# Patient Record
Sex: MALE | Race: WHITE | NOT HISPANIC OR LATINO | Employment: FULL TIME | URBAN - METROPOLITAN AREA
[De-identification: names, ages, dates, MRNs, and addresses within clinical notes are randomized per-mention and may not be internally consistent; named-entity substitution may affect disease eponyms.]

---

## 2019-07-21 ENCOUNTER — OFFICE VISIT (OUTPATIENT)
Dept: URGENT CARE | Facility: CLINIC | Age: 34
End: 2019-07-21
Payer: OTHER MISCELLANEOUS

## 2019-07-21 ENCOUNTER — HOSPITAL ENCOUNTER (INPATIENT)
Facility: HOSPITAL | Age: 34
LOS: 2 days | Discharge: HOME | DRG: 269 | End: 2019-07-23
Attending: SURGERY | Admitting: SURGERY
Payer: OTHER MISCELLANEOUS

## 2019-07-21 ENCOUNTER — ANESTHESIA EVENT (INPATIENT)
Dept: PERIOP | Facility: HOSPITAL | Age: 34
DRG: 269 | End: 2019-07-21
Payer: OTHER MISCELLANEOUS

## 2019-07-21 ENCOUNTER — ANESTHESIA (INPATIENT)
Dept: PERIOP | Facility: HOSPITAL | Age: 34
DRG: 269 | End: 2019-07-21
Payer: OTHER MISCELLANEOUS

## 2019-07-21 ENCOUNTER — APPOINTMENT (INPATIENT)
Dept: RADIOLOGY | Facility: HOSPITAL | Age: 34
DRG: 269 | End: 2019-07-21
Payer: OTHER MISCELLANEOUS

## 2019-07-21 ENCOUNTER — HOSPITAL ENCOUNTER (EMERGENCY)
Facility: HOSPITAL | Age: 34
End: 2019-07-21
Attending: EMERGENCY MEDICINE | Admitting: EMERGENCY MEDICINE
Payer: OTHER MISCELLANEOUS

## 2019-07-21 VITALS
DIASTOLIC BLOOD PRESSURE: 82 MMHG | OXYGEN SATURATION: 99 % | TEMPERATURE: 98 F | HEART RATE: 80 BPM | SYSTOLIC BLOOD PRESSURE: 140 MMHG | RESPIRATION RATE: 18 BRPM | WEIGHT: 210 LBS

## 2019-07-21 VITALS
OXYGEN SATURATION: 99 % | DIASTOLIC BLOOD PRESSURE: 82 MMHG | TEMPERATURE: 98.1 F | RESPIRATION RATE: 20 BRPM | HEART RATE: 90 BPM | WEIGHT: 210 LBS | SYSTOLIC BLOOD PRESSURE: 130 MMHG

## 2019-07-21 DIAGNOSIS — M65.9 TENOSYNOVITIS: Primary | ICD-10-CM

## 2019-07-21 DIAGNOSIS — M79.89: ICD-10-CM

## 2019-07-21 DIAGNOSIS — L03.011 CELLULITIS OF FINGER OF RIGHT HAND: Primary | ICD-10-CM

## 2019-07-21 LAB
ALBUMIN SERPL BCP-MCNC: 4.1 G/DL (ref 3.5–5)
ALP SERPL-CCNC: 50 U/L (ref 46–116)
ALT SERPL W P-5'-P-CCNC: 27 U/L (ref 12–78)
ANION GAP SERPL CALCULATED.3IONS-SCNC: 7 MMOL/L (ref 4–13)
AST SERPL W P-5'-P-CCNC: 13 U/L (ref 5–45)
BASOPHILS # BLD AUTO: 0.05 THOUSANDS/ΜL (ref 0–0.1)
BASOPHILS NFR BLD AUTO: 1 % (ref 0–1)
BILIRUB SERPL-MCNC: 0.7 MG/DL (ref 0.2–1)
BUN SERPL-MCNC: 18 MG/DL (ref 5–25)
CALCIUM SERPL-MCNC: 8.7 MG/DL (ref 8.3–10.1)
CHLORIDE SERPL-SCNC: 104 MMOL/L (ref 100–108)
CO2 SERPL-SCNC: 30 MMOL/L (ref 21–32)
CREAT SERPL-MCNC: 0.88 MG/DL (ref 0.6–1.3)
EOSINOPHIL # BLD AUTO: 0.11 THOUSAND/ΜL (ref 0–0.61)
EOSINOPHIL NFR BLD AUTO: 1 % (ref 0–6)
ERYTHROCYTE [DISTWIDTH] IN BLOOD BY AUTOMATED COUNT: 12.7 % (ref 11.6–15.1)
GFR SERPL CREATININE-BSD FRML MDRD: 111 ML/MIN/1.73SQ M
GLUCOSE SERPL-MCNC: 100 MG/DL (ref 65–140)
HCT VFR BLD AUTO: 44 % (ref 36.5–49.3)
HGB BLD-MCNC: 14.5 G/DL (ref 12–17)
IMM GRANULOCYTES # BLD AUTO: 0.02 THOUSAND/UL (ref 0–0.2)
IMM GRANULOCYTES NFR BLD AUTO: 0 % (ref 0–2)
LYMPHOCYTES # BLD AUTO: 1.5 THOUSANDS/ΜL (ref 0.6–4.47)
LYMPHOCYTES NFR BLD AUTO: 16 % (ref 14–44)
MCH RBC QN AUTO: 27.8 PG (ref 26.8–34.3)
MCHC RBC AUTO-ENTMCNC: 33 G/DL (ref 31.4–37.4)
MCV RBC AUTO: 85 FL (ref 82–98)
MONOCYTES # BLD AUTO: 0.65 THOUSAND/ΜL (ref 0.17–1.22)
MONOCYTES NFR BLD AUTO: 7 % (ref 4–12)
NEUTROPHILS # BLD AUTO: 7.02 THOUSANDS/ΜL (ref 1.85–7.62)
NEUTS SEG NFR BLD AUTO: 75 % (ref 43–75)
NRBC BLD AUTO-RTO: 0 /100 WBCS
PLATELET # BLD AUTO: 251 THOUSANDS/UL (ref 149–390)
PMV BLD AUTO: 10.2 FL (ref 8.9–12.7)
POTASSIUM SERPL-SCNC: 3.6 MMOL/L (ref 3.5–5.3)
PROT SERPL-MCNC: 7.6 G/DL (ref 6.4–8.2)
RBC # BLD AUTO: 5.21 MILLION/UL (ref 3.88–5.62)
SODIUM SERPL-SCNC: 141 MMOL/L (ref 136–145)
WBC # BLD AUTO: 9.35 THOUSAND/UL (ref 4.31–10.16)

## 2019-07-21 PROCEDURE — 87070 CULTURE OTHR SPECIMN AEROBIC: CPT | Performed by: PHYSICIAN ASSISTANT

## 2019-07-21 PROCEDURE — 73120 X-RAY EXAM OF HAND: CPT

## 2019-07-21 PROCEDURE — 80053 COMPREHEN METABOLIC PANEL: CPT | Performed by: EMERGENCY MEDICINE

## 2019-07-21 PROCEDURE — 87147 CULTURE TYPE IMMUNOLOGIC: CPT | Performed by: PHYSICIAN ASSISTANT

## 2019-07-21 PROCEDURE — 99204 OFFICE O/P NEW MOD 45 MIN: CPT | Performed by: PHYSICIAN ASSISTANT

## 2019-07-21 PROCEDURE — 26020 DRAIN HAND TENDON SHEATH: CPT | Performed by: SURGERY

## 2019-07-21 PROCEDURE — 87040 BLOOD CULTURE FOR BACTERIA: CPT | Performed by: EMERGENCY MEDICINE

## 2019-07-21 PROCEDURE — 87186 SC STD MICRODIL/AGAR DIL: CPT | Performed by: PHYSICIAN ASSISTANT

## 2019-07-21 PROCEDURE — 96360 HYDRATION IV INFUSION INIT: CPT

## 2019-07-21 PROCEDURE — 87075 CULTR BACTERIA EXCEPT BLOOD: CPT | Performed by: SURGERY

## 2019-07-21 PROCEDURE — 87147 CULTURE TYPE IMMUNOLOGIC: CPT | Performed by: SURGERY

## 2019-07-21 PROCEDURE — 11403 EXC TR-EXT B9+MARG 2.1-3CM: CPT | Performed by: PHYSICIAN ASSISTANT

## 2019-07-21 PROCEDURE — 26020 DRAIN HAND TENDON SHEATH: CPT | Performed by: PHYSICIAN ASSISTANT

## 2019-07-21 PROCEDURE — 99222 1ST HOSP IP/OBS MODERATE 55: CPT | Performed by: SURGERY

## 2019-07-21 PROCEDURE — 85025 COMPLETE CBC W/AUTO DIFF WBC: CPT | Performed by: EMERGENCY MEDICINE

## 2019-07-21 PROCEDURE — 11043 DBRDMT MUSC&/FSCA 1ST 20/<: CPT | Performed by: SURGERY

## 2019-07-21 PROCEDURE — 99284 EMERGENCY DEPT VISIT MOD MDM: CPT

## 2019-07-21 PROCEDURE — 87070 CULTURE OTHR SPECIMN AEROBIC: CPT | Performed by: SURGERY

## 2019-07-21 PROCEDURE — 87205 SMEAR GRAM STAIN: CPT | Performed by: SURGERY

## 2019-07-21 PROCEDURE — 0JBJ0ZZ EXCISION OF RIGHT HAND SUBCUTANEOUS TISSUE AND FASCIA, OPEN APPROACH: ICD-10-PCS | Performed by: SURGERY

## 2019-07-21 PROCEDURE — 36415 COLL VENOUS BLD VENIPUNCTURE: CPT | Performed by: EMERGENCY MEDICINE

## 2019-07-21 PROCEDURE — 87205 SMEAR GRAM STAIN: CPT | Performed by: PHYSICIAN ASSISTANT

## 2019-07-21 RX ORDER — ONDANSETRON 2 MG/ML
4 INJECTION INTRAMUSCULAR; INTRAVENOUS EVERY 6 HOURS PRN
Status: DISCONTINUED | OUTPATIENT
Start: 2019-07-21 | End: 2019-07-23 | Stop reason: HOSPADM

## 2019-07-21 RX ORDER — SENNOSIDES 8.6 MG
1 TABLET ORAL DAILY
Status: DISCONTINUED | OUTPATIENT
Start: 2019-07-21 | End: 2019-07-23 | Stop reason: HOSPADM

## 2019-07-21 RX ORDER — BUPIVACAINE HYDROCHLORIDE 5 MG/ML
INJECTION, SOLUTION EPIDURAL; INTRACAUDAL AS NEEDED
Status: DISCONTINUED | OUTPATIENT
Start: 2019-07-21 | End: 2019-07-21 | Stop reason: HOSPADM

## 2019-07-21 RX ORDER — CHLORHEXIDINE GLUCONATE 0.12 MG/ML
15 RINSE ORAL ONCE
Status: CANCELLED | OUTPATIENT
Start: 2019-07-21 | End: 2019-07-21

## 2019-07-21 RX ORDER — KETOROLAC TROMETHAMINE 30 MG/ML
60 INJECTION, SOLUTION INTRAMUSCULAR; INTRAVENOUS ONCE
Status: DISCONTINUED | OUTPATIENT
Start: 2019-07-21 | End: 2019-07-21 | Stop reason: HOSPADM

## 2019-07-21 RX ORDER — CEFAZOLIN SODIUM 1 G/50ML
SOLUTION INTRAVENOUS AS NEEDED
Status: DISCONTINUED | OUTPATIENT
Start: 2019-07-21 | End: 2019-07-21 | Stop reason: SURG

## 2019-07-21 RX ORDER — OXYCODONE HYDROCHLORIDE 5 MG/1
5 TABLET ORAL EVERY 4 HOURS PRN
Status: DISCONTINUED | OUTPATIENT
Start: 2019-07-21 | End: 2019-07-22

## 2019-07-21 RX ORDER — TRAMADOL HYDROCHLORIDE 50 MG/1
50 TABLET ORAL EVERY 6 HOURS PRN
Status: DISCONTINUED | OUTPATIENT
Start: 2019-07-21 | End: 2019-07-23 | Stop reason: HOSPADM

## 2019-07-21 RX ORDER — CEFAZOLIN SODIUM 1 G/50ML
1000 SOLUTION INTRAVENOUS ONCE
Status: COMPLETED | OUTPATIENT
Start: 2019-07-21 | End: 2019-07-21

## 2019-07-21 RX ORDER — FENTANYL CITRATE 50 UG/ML
INJECTION, SOLUTION INTRAMUSCULAR; INTRAVENOUS AS NEEDED
Status: DISCONTINUED | OUTPATIENT
Start: 2019-07-21 | End: 2019-07-21 | Stop reason: SURG

## 2019-07-21 RX ORDER — SODIUM CHLORIDE 9 MG/ML
INJECTION, SOLUTION INTRAVENOUS CONTINUOUS PRN
Status: DISCONTINUED | OUTPATIENT
Start: 2019-07-21 | End: 2019-07-21 | Stop reason: SURG

## 2019-07-21 RX ORDER — MAGNESIUM HYDROXIDE 1200 MG/15ML
LIQUID ORAL AS NEEDED
Status: DISCONTINUED | OUTPATIENT
Start: 2019-07-21 | End: 2019-07-21 | Stop reason: HOSPADM

## 2019-07-21 RX ORDER — PROPOFOL 10 MG/ML
INJECTION, EMULSION INTRAVENOUS CONTINUOUS PRN
Status: DISCONTINUED | OUTPATIENT
Start: 2019-07-21 | End: 2019-07-21 | Stop reason: SURG

## 2019-07-21 RX ORDER — ONDANSETRON 2 MG/ML
4 INJECTION INTRAMUSCULAR; INTRAVENOUS ONCE AS NEEDED
Status: DISCONTINUED | OUTPATIENT
Start: 2019-07-21 | End: 2019-07-21 | Stop reason: HOSPADM

## 2019-07-21 RX ORDER — FENTANYL CITRATE/PF 50 MCG/ML
25 SYRINGE (ML) INJECTION
Status: DISCONTINUED | OUTPATIENT
Start: 2019-07-21 | End: 2019-07-21 | Stop reason: HOSPADM

## 2019-07-21 RX ORDER — DOCUSATE SODIUM 100 MG/1
100 CAPSULE, LIQUID FILLED ORAL 2 TIMES DAILY
Status: DISCONTINUED | OUTPATIENT
Start: 2019-07-21 | End: 2019-07-23 | Stop reason: HOSPADM

## 2019-07-21 RX ORDER — LIDOCAINE HYDROCHLORIDE 10 MG/ML
INJECTION, SOLUTION EPIDURAL; INFILTRATION; INTRACAUDAL; PERINEURAL AS NEEDED
Status: DISCONTINUED | OUTPATIENT
Start: 2019-07-21 | End: 2019-07-21 | Stop reason: HOSPADM

## 2019-07-21 RX ORDER — CALCIUM CARBONATE 200(500)MG
1000 TABLET,CHEWABLE ORAL DAILY PRN
Status: DISCONTINUED | OUTPATIENT
Start: 2019-07-21 | End: 2019-07-23 | Stop reason: HOSPADM

## 2019-07-21 RX ORDER — ACETAMINOPHEN 325 MG/1
650 TABLET ORAL EVERY 6 HOURS PRN
Status: DISCONTINUED | OUTPATIENT
Start: 2019-07-21 | End: 2019-07-22

## 2019-07-21 RX ADMIN — SODIUM CHLORIDE: 0.9 INJECTION, SOLUTION INTRAVENOUS at 16:06

## 2019-07-21 RX ADMIN — KETOROLAC TROMETHAMINE 60 MG: 30 INJECTION, SOLUTION INTRAMUSCULAR; INTRAVENOUS at 08:55

## 2019-07-21 RX ADMIN — ACETAMINOPHEN 650 MG: 325 TABLET, FILM COATED ORAL at 22:03

## 2019-07-21 RX ADMIN — SODIUM CHLORIDE 1000 ML: 0.9 INJECTION, SOLUTION INTRAVENOUS at 10:25

## 2019-07-21 RX ADMIN — OXYCODONE HYDROCHLORIDE 5 MG: 5 TABLET ORAL at 22:25

## 2019-07-21 RX ADMIN — TRAMADOL HYDROCHLORIDE 50 MG: 50 TABLET, COATED ORAL at 20:29

## 2019-07-21 RX ADMIN — CEFAZOLIN SODIUM 1000 MG: 1 SOLUTION INTRAVENOUS at 15:35

## 2019-07-21 RX ADMIN — FENTANYL CITRATE 100 MCG: 50 INJECTION INTRAMUSCULAR; INTRAVENOUS at 16:00

## 2019-07-21 RX ADMIN — STANDARDIZED SENNA CONCENTRATE 8.6 MG: 8.6 TABLET ORAL at 14:56

## 2019-07-21 RX ADMIN — CEFAZOLIN SODIUM 1000 MG: 1 SOLUTION INTRAVENOUS at 16:13

## 2019-07-21 RX ADMIN — FENTANYL CITRATE 100 MCG: 50 INJECTION INTRAMUSCULAR; INTRAVENOUS at 16:07

## 2019-07-21 RX ADMIN — AMPICILLIN SODIUM AND SULBACTAM SODIUM 3 G: 2; 1 INJECTION, POWDER, FOR SOLUTION INTRAMUSCULAR; INTRAVENOUS at 21:53

## 2019-07-21 RX ADMIN — PROPOFOL 100 MCG/KG/MIN: 10 INJECTION, EMULSION INTRAVENOUS at 16:07

## 2019-07-21 NOTE — ANESTHESIA PREPROCEDURE EVALUATION
Review of Systems/Medical History  Patient summary reviewed        Cardiovascular  Negative cardio ROS    Pulmonary  Negative pulmonary ROS        GI/Hepatic  Negative GI/hepatic ROS          Negative  ROS        Endo/Other  Negative endo/other ROS      GYN  Negative gynecology ROS          Hematology  Negative hematology ROS      Musculoskeletal  Negative musculoskeletal ROS        Neurology  Negative neurology ROS      Psychology   Negative psychology ROS              Physical Exam    Airway    Mallampati score: II  TM Distance: >3 FB  Neck ROM: full     Dental       Cardiovascular  Comment: Negative ROS, Cardiovascular exam normal    Pulmonary  Pulmonary exam normal     Other Findings        Anesthesia Plan  ASA Score- 1 Emergent    Anesthesia Type- IV sedation with anesthesia with ASA Monitors  Additional Monitors:   Airway Plan:         Plan Factors-    Induction- intravenous  Postoperative Plan-     Informed Consent- Anesthetic plan and risks discussed with patient  I personally reviewed this patient with the CRNA  Discussed and agreed on the Anesthesia Plan with the CRNA  Edna Marion

## 2019-07-21 NOTE — PATIENT INSTRUCTIONS
Cellulitis of the right fourth digit:   -There was severe edema and erythema of the entire digit  There is a wound abscess of the right fourth digit over the PIP joint likely secondary to the beesting  There is also concern for septic arthritis as there is warmth and severe pain  The digit was punctured with an 11 blade and copious amounts of purulent drainage was expressed  The wound was cultured  The patient had immediate improvement of his pain but was still tearful and in distress  Toradol 60mg IM was given for the pain  We discussed that the patient may need to see a hand surgeon and may need IV antibiotic for the infection and it was advised he go to the ED  The wound was covered and he was sent to the ED by car as his wife wanted to drive him  Report was given to the ED

## 2019-07-21 NOTE — ED PROVIDER NOTES
History  Chief Complaint   Patient presents with    Abscess     Pt states that he was stung by a bee on the 4th digit of R hand on Tues, started having pain on Fri night, and swelling Sat  Pt states that he is unable to move digit  Patient is a 13-year-old male that presents from our outpatient clinic for a painful swollen right 4th finger  Patient states 4 days ago he fell to get stung on the finger there was some minimal discomfort but there was no swelling or redness  Patient states 2 days ago it started to swell up on him and yesterday it became very red and painful and he was unable to bend his finger  There has been no fevers or chills, no other symptoms  Patient was seen at her outpatient clinic given IM Toradol for pain relief and the medical professional at their also did a small stab incision to the palmar surface of his finger between the PIP and the IP joints with reportedly copious amount of pus-like material   Patient is not a diabetic  Prior to Admission Medications   Prescriptions: None   Facility-Administered Medications Last Administration Doses Remaining   ketorolac (TORADOL) injection 60 mg 7/21/2019  8:55 AM 0          History reviewed  No pertinent past medical history  History reviewed  No pertinent surgical history  History reviewed  No pertinent family history  I have reviewed and agree with the history as documented  Social History     Tobacco Use    Smoking status: Never Smoker    Smokeless tobacco: Never Used   Substance Use Topics    Alcohol use: Yes     Comment: rarely    Drug use: Never        Review of Systems   Constitutional: Negative for chills and fever  HENT: Negative for facial swelling and trouble swallowing  Respiratory: Negative for chest tightness and shortness of breath  Cardiovascular: Negative for chest pain and leg swelling  Gastrointestinal: Negative for abdominal pain, nausea and vomiting     Genitourinary: Negative for dysuria and flank pain  Musculoskeletal: Negative for back pain and neck pain  Skin: Positive for color change  Neurological: Negative for weakness and numbness  Hematological: Negative  Psychiatric/Behavioral: Negative  Physical Exam  Physical Exam   Constitutional: He is oriented to person, place, and time  He appears well-developed and well-nourished  HENT:   Head: Normocephalic and atraumatic  Neck: Normal range of motion  Neck supple  Cardiovascular: Normal rate and regular rhythm  Pulmonary/Chest: Effort normal and breath sounds normal    Abdominal: Soft  He exhibits no distension  There is no tenderness  Musculoskeletal:        Right hand: He exhibits decreased range of motion, tenderness, bony tenderness and swelling  He exhibits normal two-point discrimination  Normal sensation noted  Hands:  Patient's right hand has no palmar, vena, or hypo thenar tenderness  The patient is unable to flex his finger and is tender to palpation along the tendon sheaths   Neurological: He is alert and oriented to person, place, and time  Skin: Skin is warm  There is erythema  Psychiatric: He has a normal mood and affect  Nursing note and vitals reviewed                Vital Signs  ED Triage Vitals [07/21/19 0937]   Temperature Pulse Respirations Blood Pressure SpO2   98 °F (36 7 °C) 80 18 140/82 99 %      Temp src Heart Rate Source Patient Position - Orthostatic VS BP Location FiO2 (%)   -- -- -- -- --      Pain Score       8           Vitals:    07/21/19 0937   BP: 140/82   Pulse: 80         Visual Acuity      ED Medications  Medications   sodium chloride 0 9 % bolus 1,000 mL (1,000 mL Intravenous New Bag 7/21/19 1025)       Diagnostic Studies  Results Reviewed     Procedure Component Value Units Date/Time    Comprehensive metabolic panel [[id number]] Collected:  07/21/19 1025    Lab Status:  Final result Specimen:  Blood from Arm, Left Updated:  07/21/19 1048     Sodium 141 mmol/L Potassium 3 6 mmol/L      Chloride 104 mmol/L      CO2 30 mmol/L      ANION GAP 7 mmol/L      BUN 18 mg/dL      Creatinine 0 88 mg/dL      Glucose 100 mg/dL      Calcium 8 7 mg/dL      AST 13 U/L      ALT 27 U/L      Alkaline Phosphatase 50 U/L      Total Protein 7 6 g/dL      Albumin 4 1 g/dL      Total Bilirubin 0 70 mg/dL      eGFR 111 ml/min/1 73sq m     Narrative:       National Kidney Disease Foundation guidelines for Chronic Kidney Disease (CKD):     Stage 1 with normal or high GFR (GFR > 90 mL/min/1 73 square meters)    Stage 2 Mild CKD (GFR = 60-89 mL/min/1 73 square meters)    Stage 3A Moderate CKD (GFR = 45-59 mL/min/1 73 square meters)    Stage 3B Moderate CKD (GFR = 30-44 mL/min/1 73 square meters)    Stage 4 Severe CKD (GFR = 15-29 mL/min/1 73 square meters)    Stage 5 End Stage CKD (GFR <15 mL/min/1 73 square meters)  Note: GFR calculation is accurate only with a steady state creatinine    CBC and differential [[id number]] Collected:  07/21/19 1025    Lab Status:  Final result Specimen:  Blood from Arm, Left Updated:  07/21/19 1033     WBC 9 35 Thousand/uL      RBC 5 21 Million/uL      Hemoglobin 14 5 g/dL      Hematocrit 44 0 %      MCV 85 fL      MCH 27 8 pg      MCHC 33 0 g/dL      RDW 12 7 %      MPV 10 2 fL      Platelets 985 Thousands/uL      nRBC 0 /100 WBCs      Neutrophils Relative 75 %      Immat GRANS % 0 %      Lymphocytes Relative 16 %      Monocytes Relative 7 %      Eosinophils Relative 1 %      Basophils Relative 1 %      Neutrophils Absolute 7 02 Thousands/µL      Immature Grans Absolute 0 02 Thousand/uL      Lymphocytes Absolute 1 50 Thousands/µL      Monocytes Absolute 0 65 Thousand/µL      Eosinophils Absolute 0 11 Thousand/µL      Basophils Absolute 0 05 Thousands/µL     Blood culture #2 [[id number]] Collected:  07/21/19 1025    Lab Status:   In process Specimen:  Blood from Arm, Right Updated:  07/21/19 1031    Blood culture #1 [[id number]] Collected:  07/21/19 1020    Lab Status: In process Specimen:  Blood from Arm, Left Updated:  07/21/19 1031    Lactic acid, plasma [[id number]] Collected:  07/21/19 1025    Lab Status:  No result Specimen:  Blood from Arm, Left                  No orders to display              Procedures  Procedures       ED Course                               MDM  Number of Diagnoses or Management Options  Tenosynovitis:   Diagnosis management comments: Patient most likely has a significant cellulitis or possibly a tenosynovitis  I spoke to the hand surgeon on call Dr Lynette Santoro who is on call at our [facility] hospital and therefore the patient is going to be transferred over to [facility] for further treatment and evaluation  Answered all questions best my ability, patient is wife state understanding and are in agreement with the assessment plan  Amount and/or Complexity of Data Reviewed  Clinical lab tests: ordered and reviewed        Disposition  Final diagnoses:   Tenosynovitis     Time reflects when diagnosis was documented in both MDM as applicable and the Disposition within this note     Time User Action Codes Description Comment    7/21/2019 10:56 AM Reji Sharma Add [M65 9] Tenosynovitis       ED Disposition     ED Disposition Condition Date/Time Comment    Transfer to Another Facility-In Network  [place] Jul 21, 2019 10:57 AM Jordy Moore should be transferred out to [facility] and has been medically cleared          MD Documentation      Most Recent Value   Patient Condition  The patient has been stabilized such that within reasonable medical probability, no material deterioration of the patient condition or the condition of the unborn child(humberto) is likely to result from the transfer   Reason for Transfer  Level of Care needed not available at this facility   Benefits of Transfer  Specialized equipment and/or services available at the receiving facility (Include comment)________________________   Risks of Transfer  Potential for delay in receiving treatment   Accepting Physician  Dr Brittany Rojas Name, [facility]   Sending MD  [address]   Provider Certification  Risk of worsening condition      RN Documentation      Most [facility] Name, [address]      Follow-up Information    None         There are no discharge medications for this patient  No discharge procedures on file      ED Provider  Electronically Signed by           Jonathan Poole MD  07/21/19 6934

## 2019-07-21 NOTE — ED NOTES
Patient's fourth finger,right hand noted to be swollen,red,painful,difficult to move  ER MD to examine patient       Sivan Russell RN  07/21/19 8372

## 2019-07-21 NOTE — ED NOTES
Spoke with nurse in [address] at [facility] , Dr Tora Dakin unable to see pt for 2-3 hours , pt and wife upset told it was an emergency and that he would be started on antibiotics as soon as he arrived  (have received no orders from Dr Tora Dakin)  Wife on phone with [facility] regarding transfer , IV line pulled prior to leaving [place]  Because pt declined on ems transport        Marni Gowers, RN  07/21/19 9007

## 2019-07-21 NOTE — DISCHARGE INSTRUCTIONS
You have had surgery on your arm today, please read and follow the information below   Elevate your hand above your elbow during the next 24-48 hours to help with swelling   Place your hand and arm over your head with motion at your shoulder three times a day   Do not apply any cream/ointment/oil to your incisions including antibiotics   Do not soak your hands in standing water (dishwater, tubs, Jacuzzis, pools, etc) until given permission (typically 2-3 weeks after injury)  Call the office if you notice any:   Increased numbness or tingling of you hand or fingers that is not relieved with elevation   Increasing pain that is not controlled with medication   Difficulty chewing, breathing, swallowing   Chest pains or shortness of breath   Fever over 101 4 degrees    Additional Information  Do not remove your dressings until follow-up  Move your fingers 10 times per hour  Do not move any splinted fingers  , No sling is needed  For pain please see your prescription for [place] and Toradol  Complete script for Bactrim DS    Please arrange for a follow-up in 5-7 days

## 2019-07-21 NOTE — EMTALA/ACUTE CARE TRANSFER
[address] [address]  Dept: [phone]      EMTALA TRANSFER CONSENT    NAME Willi Prater                                         1985                              MRN [id number]    I have been informed of my rights regarding examination, treatment, and transfer   by Dr Cathlean Schlatter, MD    Benefits: Specialized equipment and/or services available at the receiving facility (Include comment)________________________    Risks: Potential for delay in receiving treatment      Transfer Request   I acknowledge that my medical condition has been evaluated and explained to me by the emergency department physician or other qualified medical person and/or my attending physician who has recommended and offered to me further medical examination and treatment  I understand the Hospital's obligation with respect to the treatment and stabilization of my emergency medical condition  I nevertheless request to be transferred  I release the Hospital, the doctor, and any other persons caring for me from all responsibility or liability for any injury or ill effects that may result from my transfer and agree to accept all responsibility for the consequences of my choice to transfer, rather than receive stabilizing treatment at the Hospital  I understand that because the transfer is my request, my insurance may not provide reimbursement for the services  The Hospital will assist and direct me and my family in how to make arrangements for transfer, but the hospital is not liable for any fees charged by the transport service  In spite of this understanding, I refuse to consent to further medical examination and treatment which has been offered to me, and request transfer to  [facility]ette John Name, [address] : [address]   I authorize the performance of emergency medical procedures and treatments upon me in both transit and upon arrival at the receiving facility  Additionally, I authorize the release of any and all medical records to the receiving facility and request they be transported with me, if possible  I authorize the performance of emergency medical procedures and treatments upon me in both transit and upon arrival at the receiving facility  Additionally, I authorize the release of any and all medical records to the receiving facility and request they be transported with me, if possible  I understand that the safest mode of transportation during a medical emergency is an ambulance and that the Hospital advocates the use of this mode of transport  Risks of traveling to the receiving facility by car, including absence of medical control, life sustaining equipment, such as oxygen, and medical personnel has been explained to me and I fully understand them  (AARTI CORRECT BOX BELOW)  [  ]  I consent to the stated transfer and to be transported by ambulance/helicopter  [  ]  I consent to the stated transfer, but refuse transportation by ambulance and accept full responsibility for my transportation by car  I understand the risks of non-ambulance transfers and I exonerate the Hospital and its staff from any deterioration in my condition that results from this refusal     X___________________________________________    DATE  19  TIME________  Signature of patient or legally responsible individual signing on patient behalf           RELATIONSHIP TO PATIENT_________________________          Provider Certification    NAME Mayo Nagy                                         1985                              MRN [id number]    A medical screening exam was performed on the above named patient  Based on the examination:    Condition Necessitating Transfer The encounter diagnosis was Tenosynovitis      Patient Condition: The patient has been stabilized such that within reasonable medical probability, no material deterioration of the patient condition or the condition of the unborn child(humberto) is likely to result from the transfer    Reason for Transfer: Level of Care needed not available at this facility    Transfer Requirements: [facility]   · Space available and qualified personnel available for treatment as acknowledged by    · Agreed to accept transfer and to provide appropriate medical treatment as acknowledged by       Dr Paola Hong  · Appropriate medical records of the examination and treatment of the patient are provided at the time of transfer   [facility], [address] _______  · Transfer will be performed by qualified personnel from    and appropriate transfer equipment as required, including the use of necessary and appropriate life support measures  Provider Certification: I have examined the patient and explained the following risks and benefits of being transferred/refusing transfer to the patient/family:  Risk of worsening condition      Based on these reasonable risks and benefits to the patient and/or the unborn child(humberto), and based upon the information available at the time of the patients examination, I certify that the medical benefits reasonably to be expected from the provision of appropriate medical treatments at another medical facility outweigh the increasing risks, if any, to the individuals medical condition, and in the case of labor to the unborn child, from effecting the transfer      X____________________________________________ DATE 07/21/19        TIME_______      ORIGINAL - SEND TO MEDICAL RECORDS   COPY - SEND WITH PATIENT DURING TRANSFER

## 2019-07-21 NOTE — OP NOTE
OPERATIVE REPORT  PATIENT NAME: Sumaya Silverio  :  1985  MRN: [id number]  Pt Location: [facility]    SURGERY DATE: 19    Surgeon(s) and Role:     * Arlette Wilde MD - Primary     * Aylin Nicole PA-C - Assisting    Pre-Op Diagnosis:  Cellulitis of finger of right hand [H11 718]    Post-Op Diagnosis Codes:     * Cellulitis of finger of right hand [P22 674]    Procedure(s):  DEBRIDEMENT HAND/FINGER ([address]) (Right)    Specimen(s):  Order Name Source Comment Collection Info Order Time   ANAEROBIC CULTURE  [facility] By: Arlette Wilde MD 2019  4:28 PM   WOUND CULTURE Wound  Collected By: Arlette Wilde MD 2019  4:28 PM       Estimated Blood Loss:   Minimal    Anesthesia Type:   Conscious Sedation     IMPLANTS:  * No implants in log *    PERIOPERATIVE ANTIBIOTICS:    cefazolin, 1 gram    Tourniquet Time: 27 min at 250  mmHg          Operative Indications: The patient has a history of right ring finger puncture injury 5 days prior with formation of abscess and concern for tenosynovitis of the flexor tendon sheath that was recalcitrant to conservative management  The decision was made to bring the patient to the operating room for right ring finger irrigation debridement and irrigation of the flexor tendon sheath  Risks of the procedure were explained which include, but are not limited to bleeding; infection; damage to nerves, arteries,veins, tendons; scar; pain; need for reoperation; failure to give desired result; and risks of anaesthesia  All questions were answered to satisfaction and they were willing to proceed           Operative Findings:    Purulent collection superficial to the flexor tendon sheath with small communication  Purulent fluid at the level of the A4 and A5 pulley within the flexor tendon sheath  Murky fluid at the level of the A1 pulley without maria c evidence of flexor tenosynovitis proximal to the MP joint      Complications: None    Procedure and Technique:  After the patient, site, and procedure were identified, the patient was brought into the operating room in a supine position  Local anaesthesia and sedation were provided  A well padded tourniquet was applied to the extremity, set at 250 mmHg  The  right upper extremity was then prepped and drapped in a normal, sterile, orthopedic fashion  Burn type incision was made over the localized abscess between the PIP and the DIP joints volarly this was then extended proximally in the mid axial line on the radial side of the ring finger  Upon making incision with a 15 blade under loupe magnification program fluid was immediately noted and cultures were taken both aerobic and anaerobic  Next under loupe magnification being careful to protect the superficial neurovascular structures the full-thickness skin flaps were elevated and the flexor tendon sheath was identified  There appear to be partial absence of the A4 pulley and a rent in the A5 pulley with purulent drainage draining from both of the sites  [place] Cut[place] Next using clean instruments a longitudinal incision was made over a the A1 pulley  Neurovascular structures were protected and uses 64 blade the A1 was released  Was not this point there was no purulent fluid however there was some murky fluid within the flexor tendon sheath  Using a small loop protractor the flexor tendon integrity was tested and was found to be intact  [place] Cut[place] Next we turned our attention back to the area of purulence  The superficial abscess was debrided equaling approximately a 1/2 by 1/2 cm area  This was done with blunt dissection as well as with abrasion with a Ray-Laila  Any necrotic tissue including fat fascia skin but not muscle or bone was debrided sharply with a scissor    Once adequate debridement had taken place next we placed a 5 Norwegian feeding tube catheter in her at anterograde fashion passing from the A1 pulley all the way down to the A4 and used approximately 300 cc of sterile saline to irrigate  An additional 200 cc were utilized to irrigate the A5 pulley area  At the completion of the procedure, hemostasis was obtained with cautery and direct pressure  The wounds were copiously irrigated with sterile solution  The wounds were closed with Prolene  Sterile dressings were applied, including Xeroform, gauze, tweeners, webril, ACE and Finger Dressing  Please note, all sponge, needle, and instrument counts were correct prior to closure  Loupe magnification was utilized  The patient tolerated the procedure well       I was present for the entire procedure, A qualified resident physician was not available and A physician assistant was required during the procedure for retraction tissue handling,dissection and suturing    Patient Disposition:  PACU     SIGNATURE: Glory Tobin MD  DATE: 07/21/19  TIME: 5:01 PM

## 2019-07-21 NOTE — PROGRESS NOTES
[facility]        NAME: Elvin Travis is a 37 y o  male  : 1985    MRN: [id number]  DATE: 2019  TIME: 11:29 AM    Assessment and Plan   Cellulitis of finger of right hand [L03 011]  1  Cellulitis of finger of right hand  Wound culture and Gram stain   2  Swelling of ring finger  ketorolac (TORADOL) injection 60 mg     Incision and drain  Date/Time: 2019 11:18 AM  Performed by: Roya Sexton PA-C  Authorized by: Roya Sexton PA-C     Patient location:  Clinic  Consent:     Consent obtained:  Verbal    Consent given by:  Patient    Risks discussed:  Incomplete drainage, pain, bleeding and infection    Alternatives discussed:  No treatment and delayed treatment  Universal protocol:     Patient identity confirmed:  Verbally with patient  Location:     Type:  Abscess (There is an abscess and pustule of the right 4th digit between the palmar aspect of the PIP and IP joint)    Size:  2cm     Location:  Upper extremity    Upper extremity location:  R ring finger  Pre-procedure details:     Skin preparation:  Antiseptic wash and Betadine  Anesthesia (see MAR for exact dosages): Anesthesia method:  None  Procedure details:     Complexity:  Simple    Incision types:  Stab incision    Scalpel blade:  11    Approach:  Puncture    Incision depth:  Subcutaneous    Drainage:  Purulent and bloody    Drainage amount:  Copious    Wound treatment:  Wound left open    Packing materials:  None  Post-procedure details:     Patient tolerance of procedure: Tolerated with difficulty  Comments: There was immediate pain relief as the lesion was drained but approximately 10-15 minutes after the procedure the pain intensified         Patient Instructions   Cellulitis of the right fourth digit:   -There was severe edema and erythema of the entire digit  There is a wound abscess of the right fourth digit over the PIP joint likely secondary to the beesting   There is also concern for septic arthritis as there is warmth and severe pain  The digit was punctured with an 11 blade and copious amounts of purulent drainage was expressed  The wound was cultured  The patient had immediate improvement of his pain but was still tearful and in distress  Toradol 60mg IM was given for the pain  We discussed that the patient may need to see a hand surgeon and may need IV antibiotic for the infection and it was advised he go to the ED  The wound was covered and he was sent to the ED by car as his wife wanted to drive him  Report was given to the ED  Proceed to  ER     Chief Complaint     Chief Complaint   Patient presents with    Finger Injury     possible beesting tuesday  History of Present Illness       Naomi Lenz is a 24-year-old male who presents today for right fourth digit erythema and edema  The patient reports that he was at work at [employer] in the [employer] five days ago and feels that he was stung by a bee, he states that there was no edema or erythema after the event  He states that two days ago he began to experience diffuse erythema and edema of his entire digit  He states that the digit is painful and that he has decreased ROM due to the pain  He has been icing the digit, taking benadryl and Advil with no relief  He denies fever, chills, rash, trauma  He denies numbness, weakness or tingling of the digit  Review of Systems   Review of Systems   Constitutional: Negative for activity change, appetite change, chills, diaphoresis, fatigue and fever  HENT: Negative for facial swelling, sore throat and trouble swallowing  Respiratory: Negative for chest tightness, shortness of breath, wheezing and stridor  Cardiovascular: Negative for chest pain and palpitations  Musculoskeletal: Positive for arthralgias and joint swelling  Skin: Positive for color change and wound  Negative for pallor     Allergic/Immunologic: Negative for environmental allergies, food allergies and immunocompromised state  Neurological: Negative for dizziness and light-headedness  Hematological: Negative for adenopathy  Does not bruise/bleed easily  Current Medications     No current facility-administered medications for this visit  No current outpatient medications on file  Facility-Administered Medications Ordered in Other Visits:     acetaminophen (TYLENOL) tablet 650 mg, 650 mg, Oral, Q6H, Ova Ku, PA-C, 650 mg at 07/22/19 0828    ampicillin-sulbactam (UNASYN) 3 g in sodium chloride 0 9 % 100 mL IVPB, 3 g, Intravenous, Q6H, Ova Ku, PA-C, Last Rate: 200 mL/hr at 07/22/19 0306, 3 g at 07/22/19 0306    calcium carbonate (TUMS) chewable tablet 1,000 mg, 1,000 mg, Oral, Daily PRN, Ova Ku, PA-C    docusate sodium (COLACE) capsule 100 mg, 100 mg, Oral, BID, Ova Ku, PA-C, 100 mg at 07/22/19 0828    ondansetron (ZOFRAN) injection 4 mg, 4 mg, Intravenous, Q6H PRN, Ova Ku, PA-C    oxyCODONE (ROXICODONE) IR tablet 5 mg, 5 mg, Oral, Q4H PRN, Ova Ku, PA-C, 5 mg at 07/22/19 0303    senna (SENOKOT) tablet 8 6 mg, 1 tablet, Oral, Daily, Ova Ku, PA-C, 8 6 mg at 07/22/19 0026    traMADol (ULTRAM) tablet 50 mg, 50 mg, Oral, Q6H PRN, Ova Ku, PA-C, 50 mg at 07/21/19 2029    Current Allergies     Allergies as of 07/21/2019    (No Known Allergies)            The following portions of the patient's history were reviewed and updated as appropriate: allergies, current medications, past family history, past medical history, past social history, past surgical history and problem list      No past medical history on file  Past Surgical History:   Procedure Laterality Date    HAND DEBRIDEMENT Right 7/21/2019    Procedure: DEBRIDEMENT HAND/FINGER [facility] OUT); Surgeon: Heidi Fabian MD;  Location: [facility];  Service: Orthopedics       No family history on file  Medications have been verified          Objective   /82   Pulse 90   Temp 98 1 °F (36 7 °C)   Resp 20   Wt 95 3 kg (210 lb)   SpO2 99%        Physical Exam     Physical Exam   Constitutional: He appears well-developed and well-nourished  Non-toxic appearance  He does not have a sickly appearance  He appears distressed (in distress due to pain )  Cardiovascular: Normal rate, regular rhythm, S1 normal, S2 normal and normal heart sounds  Exam reveals no gallop, no distant heart sounds and no friction rub  No murmur heard  Pulmonary/Chest: Effort normal and breath sounds normal  No stridor  He has no decreased breath sounds  He has no wheezes  He has no rhonchi  He has no rales  Musculoskeletal:        Right hand: He exhibits decreased range of motion, tenderness and swelling  He exhibits no bony tenderness, normal capillary refill and no laceration  Decreased sensation noted  Decreased strength noted  Hands: There is diffuse erythema and severe edema of the entire right 4th digit  The skin is shiny and the finger is firm to palpation and is severely tender  There is a small pustule visualized on the palmar aspect between the PIP and IP joint  There is severely limited ROM due to pain and swelling  The finger is neurovascularly intact  Neurological: No sensory deficit  Skin: Skin is warm, dry and intact  Capillary refill takes less than 2 seconds  Lesion noted  No bruising and no ecchymosis noted  He is not diaphoretic  There is erythema  No cyanosis  Psychiatric: He has a normal mood and affect  His behavior is normal    Nursing note and vitals reviewed

## 2019-07-21 NOTE — H&P
H&P Exam - Orthopedics   Willi Prater 37 y o  male MRN: [id number]  Unit/Bed#: ED 24    Assessment/Plan   Assessment:  Right 4th finger abscess and tenosynovitis     Plan:  Plan for OR today for I&D  Risks and benefits of surgery were discussed with the patient and family and informed consent was obtained  Patient will be admitted postoperatively for IV antibiotics and monitoring  He will be discharged on p o  Antibiotics and will follow up on wound cultures and adjust antibiotics as needed  He will remain in his dressing for 1 week and will see him back likely at the end of this week to make sure  the infection is cleared  History of Present Illness   HPI:  Willi Prater is a 37 y o  y o  male who presents with right 4th finger infection  Patient works at [employer] and states this happened on Tuesday  He is unsure of what he was injured by but attributes it to a bee sting  He states over the next 2 days it continued to get worse  To the point where it was draining and he had difficulty moving it  Patient denies fevers or chills  Currently he notes significant pain in the volar aspect of the finger extending to the MP joint  He does note some tingling sensation in the distal portion of his finger  Historical Information  Review Of Systems:   · Skin: Normal  · Neuro: See HPI  · Musculoskeletal: See HPI  · 14 point review of systems negative except as stated above     Past Medical History:   History reviewed  No pertinent past medical history  Past Surgical History:   History reviewed  No pertinent surgical history  Family History:  Family history reviewed and non-contributory  History reviewed  No pertinent family history      Social History:  Social History     Socioeconomic History    Marital status: /Civil Union     Spouse name: None    Number of children: None    Years of education: None    Highest education level: None   Occupational History    None   Social Needs    Financial resource strain: None    Food insecurity:     Worry: None     Inability: None    Transportation needs:     Medical: None     Non-medical: None   Tobacco Use    Smoking status: Never Smoker    Smokeless tobacco: Never Used   Substance and Sexual Activity    Alcohol use: Yes     Comment: rarely    Drug use: Never    Sexual activity: None   Lifestyle    Physical activity:     Days per week: None     Minutes per session: None    Stress: None   Relationships    Social connections:     Talks on phone: None     Gets together: None     Attends Tenri[employer] service: None     Active member of club or organization: None     Attends meetings of clubs or organizations: None     Relationship status: None    Intimate partner violence:     Fear of current or ex partner: None     Emotionally abused: None     Physically abused: None     Forced sexual activity: None   Other Topics Concern    None   Social History Narrative    None       Allergies:   No Known Allergies        Labs:  0   Lab Value Date/Time    HCT 44 0 07/21/2019 1025    HGB 14 5 07/21/2019 1025    WBC 9 35 07/21/2019 1025       Meds:    Current Facility-Administered Medications:     calcium carbonate (TUMS) chewable tablet 1,000 mg, 1,000 mg, Oral, Daily PRN, Balwinder Don PA-C    ceFAZolin (ANCEF) IVPB (premix) 1,000 mg, 1,000 mg, Intravenous, Once, Sharon Chino MD, Last Rate: 100 mL/hr at 07/21/19 1535, 1,000 mg at 07/21/19 1535    docusate sodium (COLACE) capsule 100 mg, 100 mg, Oral, BID, Balwinder Don PA-C    ondansetron [facility]) injection 4 mg, 4 mg, Intravenous, Q6H PRN, Balwinder Don PA-C    senna (SENOKOT) tablet 8 6 mg, 1 tablet, Oral, Daily, Balwinder Don PA-C, 8 6 mg at 07/21/19 1456  No current outpatient medications on file  Blood Culture:   No results found for: BLOODCX    Wound Culture:   No results found for: WOUNDCULT    Ins and Outs:  No intake/output data recorded          Physical Exam  /74 (BP Location: Right arm)   Pulse 77 Temp 99 1 °F (37 3 °C)   Resp 16   SpO2 97%   Gen: Alert and oriented to person, place, time  HEENT: EOMI, eyes clear, moist mucus membranes, hearing intact  Respiratory: Bilateral chest rise  No audible wheezing found  Cardiovascular: Regular Rate and Rhythm  Abdomen: soft nontender/nondistended  Ortho Exam:  Right 4th finger appears enlarged and inflamed with puncture wounds on the ulnar aspect an associated clear  and purulent drainage  Finger held in flexed posture, TTP along flexor tendon sheath extending to MP but not A1 pulley  Pain with passive flexion of the finger   With significant circumferential swelling     Neuro Exam:  Subjective tingling at the distal tip of the finger, sensation intact to light touch in the proximal finger and dorsal aspect    Lab Results: Reviewed  Imaging: Reviewed

## 2019-07-21 NOTE — ED NOTES
Spoke with Carmen in [place] at [facility] , inquired if it would be possible to put some orders in on patient so we can start care, without incruing another ER Visit charge  He will ask Dr Dionisio Pederson to enter some orders in computer       Aroldo Merchant RN  07/21/19 [address]39 [facility] Magda Raphael RN  07/21/19 2023

## 2019-07-21 NOTE — PLAN OF CARE
Problem: PAIN - ADULT  Goal: Verbalizes/displays adequate comfort level or baseline comfort level  Description  Interventions:  - Encourage patient to monitor pain and request assistance  - Assess pain using appropriate pain scale  - Administer analgesics based on type and severity of pain and evaluate response  - Implement non-pharmacological measures as appropriate and evaluate response  - Consider cultural and social influences on pain and pain management  - Notify physician/advanced practitioner if interventions unsuccessful or patient reports new pain  Outcome: Progressing     Problem: INFECTION - ADULT  Goal: Absence or prevention of progression during hospitalization  Description  INTERVENTIONS:  - Assess and monitor for signs and symptoms of infection  - Monitor lab/diagnostic results  - Monitor all insertion sites, i e  indwelling lines, tubes, and drains  - Monitor endotracheal (as able) and nasal secretions for changes in amount and color  - [place] appropriate cooling/warming therapies per order  - Administer medications as ordered  - Instruct and encourage patient and family to use good hand hygiene technique  - Identify and instruct in appropriate isolation precautions for identified infection/condition  Outcome: Progressing     Problem: INFECTION - ADULT  Goal: Absence of fever/infection during neutropenic period  Description  INTERVENTIONS:  - Monitor WBC  - Implement neutropenic guidelines  Outcome: Progressing     Problem: SAFETY ADULT  Goal: Patient will remain free of falls  Description  INTERVENTIONS:  - Assess patient frequently for physical needs  -  Identify cognitive and physical deficits and behaviors that affect risk of falls    -  [place] fall precautions as indicated by assessment   - Educate patient/family on patient safety including physical limitations  - Instruct patient to call for assistance with activity based on assessment  - Modify environment to reduce risk of injury  - Consider OT/PT consult to assist with strengthening/mobility  Outcome: Progressing     Problem: SAFETY ADULT  Goal: Maintain or return to baseline ADL function  Description  INTERVENTIONS:  -  Assess patient's ability to carry out ADLs; assess patient's baseline for ADL function and identify physical deficits which impact ability to perform ADLs (bathing, care of mouth/teeth, toileting, grooming, dressing, etc )  - Assess/evaluate cause of self-care deficits   - Assess range of motion  - Assess patient's mobility; develop plan if impaired  - Assess patient's need for assistive devices and provide as appropriate  - Encourage maximum independence but intervene and supervise when necessary  ¯ Involve family in performance of ADLs  ¯ Assess for home care needs following discharge   ¯ Request OT consult to assist with ADL evaluation and planning for discharge  ¯ Provide patient education as appropriate  Outcome: Progressing     Problem: SAFETY ADULT  Goal: Maintain or return mobility status to optimal level  Description  INTERVENTIONS:  - Assess patient's baseline mobility status (ambulation, transfers, stairs, etc )    - Identify cognitive and physical deficits and behaviors that affect mobility  - Identify mobility aids required to assist with transfers and/or ambulation (gait belt, sit-to-stand, lift, walker, cane, etc )  - [place] fall precautions as indicated by assessment  - Record patient progress and toleration of activity level on Mobility SBAR; progress patient to next Phase/Stage  - Instruct patient to call for assistance with activity based on assessment  - Request Rehabilitation consult to assist with strengthening/weightbearing, etc   Outcome: Progressing

## 2019-07-22 LAB
ANION GAP SERPL CALCULATED.3IONS-SCNC: 10 MMOL/L (ref 4–13)
BASOPHILS # BLD AUTO: 0.05 THOUSANDS/ΜL (ref 0–0.1)
BASOPHILS NFR BLD AUTO: 1 % (ref 0–1)
BUN SERPL-MCNC: 14 MG/DL (ref 5–25)
CALCIUM SERPL-MCNC: 8.7 MG/DL (ref 8.3–10.1)
CHLORIDE SERPL-SCNC: 106 MMOL/L (ref 100–108)
CO2 SERPL-SCNC: 25 MMOL/L (ref 21–32)
CREAT SERPL-MCNC: 0.83 MG/DL (ref 0.6–1.3)
EOSINOPHIL # BLD AUTO: 0.12 THOUSAND/ΜL (ref 0–0.61)
EOSINOPHIL NFR BLD AUTO: 1 % (ref 0–6)
ERYTHROCYTE [DISTWIDTH] IN BLOOD BY AUTOMATED COUNT: 12.9 % (ref 11.6–15.1)
GFR SERPL CREATININE-BSD FRML MDRD: 113 ML/MIN/1.73SQ M
GLUCOSE SERPL-MCNC: 91 MG/DL (ref 65–140)
HCT VFR BLD AUTO: 42.5 % (ref 36.5–49.3)
HGB BLD-MCNC: 14 G/DL (ref 12–17)
IMM GRANULOCYTES # BLD AUTO: 0.02 THOUSAND/UL (ref 0–0.2)
IMM GRANULOCYTES NFR BLD AUTO: 0 % (ref 0–2)
LYMPHOCYTES # BLD AUTO: 2.07 THOUSANDS/ΜL (ref 0.6–4.47)
LYMPHOCYTES NFR BLD AUTO: 22 % (ref 14–44)
MCH RBC QN AUTO: 28.2 PG (ref 26.8–34.3)
MCHC RBC AUTO-ENTMCNC: 32.9 G/DL (ref 31.4–37.4)
MCV RBC AUTO: 86 FL (ref 82–98)
MONOCYTES # BLD AUTO: 0.66 THOUSAND/ΜL (ref 0.17–1.22)
MONOCYTES NFR BLD AUTO: 7 % (ref 4–12)
NEUTROPHILS # BLD AUTO: 6.56 THOUSANDS/ΜL (ref 1.85–7.62)
NEUTS SEG NFR BLD AUTO: 69 % (ref 43–75)
NRBC BLD AUTO-RTO: 0 /100 WBCS
PLATELET # BLD AUTO: 225 THOUSANDS/UL (ref 149–390)
PMV BLD AUTO: 10.1 FL (ref 8.9–12.7)
POTASSIUM SERPL-SCNC: 3.9 MMOL/L (ref 3.5–5.3)
RBC # BLD AUTO: 4.97 MILLION/UL (ref 3.88–5.62)
SODIUM SERPL-SCNC: 141 MMOL/L (ref 136–145)
WBC # BLD AUTO: 9.48 THOUSAND/UL (ref 4.31–10.16)

## 2019-07-22 PROCEDURE — G8989 SELF CARE D/C STATUS: HCPCS

## 2019-07-22 PROCEDURE — 99024 POSTOP FOLLOW-UP VISIT: CPT | Performed by: PHYSICIAN ASSISTANT

## 2019-07-22 PROCEDURE — 80048 BASIC METABOLIC PNL TOTAL CA: CPT | Performed by: PHYSICIAN ASSISTANT

## 2019-07-22 PROCEDURE — G8987 SELF CARE CURRENT STATUS: HCPCS

## 2019-07-22 PROCEDURE — 85025 COMPLETE CBC W/AUTO DIFF WBC: CPT | Performed by: PHYSICIAN ASSISTANT

## 2019-07-22 PROCEDURE — 97165 OT EVAL LOW COMPLEX 30 MIN: CPT

## 2019-07-22 PROCEDURE — 26010 DRAINAGE OF FINGER ABSCESS: CPT | Performed by: PHYSICIAN ASSISTANT

## 2019-07-22 RX ORDER — KETOROLAC TROMETHAMINE 30 MG/ML
15 INJECTION, SOLUTION INTRAMUSCULAR; INTRAVENOUS ONCE
Status: COMPLETED | OUTPATIENT
Start: 2019-07-22 | End: 2019-07-22

## 2019-07-22 RX ORDER — OXYCODONE HYDROCHLORIDE 5 MG/1
5 TABLET ORAL EVERY 4 HOURS PRN
Status: DISCONTINUED | OUTPATIENT
Start: 2019-07-22 | End: 2019-07-23 | Stop reason: HOSPADM

## 2019-07-22 RX ORDER — OXYCODONE HYDROCHLORIDE 5 MG/1
2.5 TABLET ORAL EVERY 4 HOURS PRN
Status: DISCONTINUED | OUTPATIENT
Start: 2019-07-22 | End: 2019-07-22

## 2019-07-22 RX ORDER — KETOROLAC TROMETHAMINE 30 MG/ML
15 INJECTION, SOLUTION INTRAMUSCULAR; INTRAVENOUS EVERY 6 HOURS PRN
Status: DISCONTINUED | OUTPATIENT
Start: 2019-07-22 | End: 2019-07-22

## 2019-07-22 RX ORDER — KETOROLAC TROMETHAMINE 30 MG/ML
15 INJECTION, SOLUTION INTRAMUSCULAR; INTRAVENOUS EVERY 6 HOURS PRN
Status: DISCONTINUED | OUTPATIENT
Start: 2019-07-22 | End: 2019-07-23 | Stop reason: HOSPADM

## 2019-07-22 RX ORDER — ACETAMINOPHEN 325 MG/1
650 TABLET ORAL EVERY 6 HOURS
Status: DISCONTINUED | OUTPATIENT
Start: 2019-07-22 | End: 2019-07-23 | Stop reason: HOSPADM

## 2019-07-22 RX ADMIN — OXYCODONE HYDROCHLORIDE 5 MG: 5 TABLET ORAL at 03:03

## 2019-07-22 RX ADMIN — AMPICILLIN SODIUM AND SULBACTAM SODIUM 3 G: 2; 1 INJECTION, POWDER, FOR SOLUTION INTRAMUSCULAR; INTRAVENOUS at 11:30

## 2019-07-22 RX ADMIN — TRAMADOL HYDROCHLORIDE 50 MG: 50 TABLET, COATED ORAL at 17:22

## 2019-07-22 RX ADMIN — STANDARDIZED SENNA CONCENTRATE 8.6 MG: 8.6 TABLET ORAL at 08:28

## 2019-07-22 RX ADMIN — ACETAMINOPHEN 650 MG: 325 TABLET, FILM COATED ORAL at 08:28

## 2019-07-22 RX ADMIN — KETOROLAC TROMETHAMINE 15 MG: 30 INJECTION, SOLUTION INTRAMUSCULAR at 07:44

## 2019-07-22 RX ADMIN — AMPICILLIN SODIUM AND SULBACTAM SODIUM 3 G: 2; 1 INJECTION, POWDER, FOR SOLUTION INTRAMUSCULAR; INTRAVENOUS at 03:06

## 2019-07-22 RX ADMIN — DOCUSATE SODIUM 100 MG: 100 CAPSULE, LIQUID FILLED ORAL at 17:22

## 2019-07-22 RX ADMIN — AMPICILLIN SODIUM AND SULBACTAM SODIUM 3 G: 2; 1 INJECTION, POWDER, FOR SOLUTION INTRAMUSCULAR; INTRAVENOUS at 22:26

## 2019-07-22 RX ADMIN — ACETAMINOPHEN 650 MG: 325 TABLET, FILM COATED ORAL at 20:30

## 2019-07-22 RX ADMIN — ACETAMINOPHEN 650 MG: 325 TABLET, FILM COATED ORAL at 14:17

## 2019-07-22 RX ADMIN — KETOROLAC TROMETHAMINE 15 MG: 30 INJECTION, SOLUTION INTRAMUSCULAR at 22:26

## 2019-07-22 RX ADMIN — AMPICILLIN SODIUM AND SULBACTAM SODIUM 3 G: 2; 1 INJECTION, POWDER, FOR SOLUTION INTRAMUSCULAR; INTRAVENOUS at 15:49

## 2019-07-22 RX ADMIN — DOCUSATE SODIUM 100 MG: 100 CAPSULE, LIQUID FILLED ORAL at 08:28

## 2019-07-22 NOTE — PROGRESS NOTES
Progress Note - Orthopedics   Neri Hamilton 37 y o  male MRN: [id number]  Unit/Bed#: -01      Subjective:    34 y o male postop day 1 from a right 4th finger I&D  Patient did note significant pain overnight but denies fever/chills  He notes improvement of the swelling and complete resolution of the tingling in his fingers that was present prior to the surgery  Labs:  0   Lab Value Date/Time    HCT 42 5 07/22/2019 0602    HCT 44 0 07/21/2019 1025    HGB 14 0 07/22/2019 0602    HGB 14 5 07/21/2019 1025    WBC 9 48 07/22/2019 0602    WBC 9 35 07/21/2019 1025       Meds:    Current Facility-Administered Medications:     acetaminophen (TYLENOL) tablet 650 mg, 650 mg, Oral, Q6H, Julienne Allred PA-C    ampicillin-sulbactam (UNASYN) 3 g in sodium chloride 0 9 % 100 mL IVPB, 3 g, Intravenous, Q6H, CHONG Brown-C, Last Rate: 200 mL/hr at 07/22/19 0306, 3 g at 07/22/19 0306    calcium carbonate (TUMS) chewable tablet 1,000 mg, 1,000 mg, Oral, Daily PRN, CHONG Brown-C    docusate sodium (COLACE) capsule 100 mg, 100 mg, Oral, BID, Julienne Allred, PA-C    ondansetron [facility]) injection 4 mg, 4 mg, Intravenous, Q6H PRN, Julienne Allred, PA-C    oxyCODONE (ROXICODONE) IR tablet 5 mg, 5 mg, Oral, Q4H PRN, Julienne Allred, PA-C, 5 mg at 07/22/19 0303    senna (SENOKOT) tablet 8 6 mg, 1 tablet, Oral, Daily, Julienne Allrde PA-C, 8 6 mg at 07/21/19 1456    traMADol (ULTRAM) tablet 50 mg, 50 mg, Oral, Q6H PRN, Julienne Shear, PA-C, 50 mg at 07/21/19 2029    Blood Culture:   No results found for: BLOODCX    Wound Culture:   No results found for: WOUNDCULT    Ins and Outs:  No intake/output data recorded            Physical:  Vitals:    07/22/19 0600   BP: 129/77   Pulse: 71   Resp: 18   Temp: 99 1 °F (37 3 °C)   SpO2: 97%     Musculoskeletal: right Upper Extremity  · Skin warm and well perfused, no erythema  · Mild edema noted on the operative finger but much improved from preop exam  · Dressing with minimal bloody drainage, changed today   · Incisions are clean, dry, intact  No purulence or serous drainage  No cheryl-incisional erythema  · TTP over incision sites, no pain over flexor tendon sheath  · SILT m/r/u  Motor intact ain/pin/m/r/u, 2+ radial pulse  · Patient has difficulty moving the operative finger secondary to pain    Assessment:    34 y o male POD 1 from right 4th finger I&D     Plan:  · Continue IV antibiotics, still awaiting cultures  Adjust antibiotics as necessary  · OT to maintain range of motion  · Pain control regimen adjusted to try and control pain  · DVT ppx with SCDs, out of bed as tolerated  · Dispo: Ortho will follow, likely discharged tomorrow with  transition to p o   Antibiotics    Reyes Boehringer, PA-C

## 2019-07-22 NOTE — PLAN OF CARE
Problem: OCCUPATIONAL THERAPY ADULT  Goal: Performs self-care activities at highest level of function for planned discharge setting  See evaluation for individualized goals  Description  Subjective: Pt reports 0/10 pain in R hand/digits at this time  Pt reports no concerns for going home  Prior Level of Functioning:    Pt reports independent with ADLs, IADLs, and functional mobility PTA  Pt lives at home with his wife and 3 children  Pt reports his wife can assist him at home as needed upon d/c     Objective: Pt supine in bed at start of session and agreeable to OT  B/l UE AROM of shoulders and elbows is WFL  AROM of digits and wrist of L UE is WFL  AROM of digits of R UE slightly impaired and RUE wrist is slightly impaired at this time 2nd to debridement procedure and ace wrap brace  Pt able to oppose thumb to 5th digit however reports discomfort when doing so  Educated pt on AROM and PROM exercises to complete with R hand/digits  Educated pt on difference b/w AROM vs PROM  Educated to complete exercises 5 x each day to prevent contracture and promote functional use of R UE  Recommended pt complete AROM to best of ability, and then attempt PROM to see if he could range joint further  Educated that discomfort is okay, however if pain noted he should stop completion of exercise at that time  Handout provided to pt on AROM and PROM exercises  Assessment: Pt is a 37 y o  male seen for OT evaluation s/p [facility] on 7/21/2019 w/ Cellulitis of finger of right hand  No known comorbidities  Pt is R-handed  Upon evaluation, pt presents with decreased ROM and impaired [address]  Education of AROM and PROM exercises provided  Pt presents with no further deficits  Cognition appears WFL  No safety awareness concerns observed  No further OT intervention is warranted at this time  D/C OT  Discharge recommendations:    From OT standpoint, pt may be d/c'd home with family support   Completion of 5x daily AROM/PROM exercises  Outcome: Completed  Note:          Subjective: Pt reports 0/10 pain in R hand/digits at this time  Pt reports no concerns for going home  Prior Level of Functioning:    Pt reports independent with ADLs, IADLs, and functional mobility PTA  Pt lives at home with his wife and 3 children  Pt reports his wife can assist him at home as needed upon d/c     Objective: Pt supine in bed at start of session and agreeable to OT  B/l UE AROM of shoulders and elbows is WFL  AROM of digits and wrist of L UE is WFL  AROM of digits of R UE slightly impaired and RUE wrist is slightly impaired at this time 2nd to debridement procedure and ace wrap brace  Pt able to oppose thumb to 5th digit however reports discomfort when doing so  Educated pt on AROM and PROM exercises to complete with R hand/digits  Educated pt on difference b/w AROM vs PROM  Educated to complete exercises 5 x each day to prevent contracture and promote functional use of R UE  Recommended pt complete AROM to best of ability, and then attempt PROM to see if he could range joint further  Educated that discomfort is okay, however if pain noted he should stop completion of exercise at that time  Handout provided to pt on AROM and PROM exercises  Assessment: Pt is a 37 y o  male seen for OT evaluation s/p admit to [facility] on 7/21/2019 w/ Cellulitis of finger of right hand  No known comorbidities  Pt is R-handed  Upon evaluation, pt presents with decreased ROM and impaired [facility]  Education of AROM and PROM exercises provided  Pt presents with no further deficits  Cognition appears WFL  No safety awareness concerns observed  No further OT intervention is warranted at this time  D/C OT  Discharge recommendations:    From OT standpoint, pt may be d/c'd home with family support  Completion of 5x daily AROM/PROM exercises

## 2019-07-22 NOTE — UTILIZATION REVIEW
Please send all determinations to our UR Dept Fax [phone]    Notification of Inpatient Admission/Inpatient Authorization Request  This is a Notification of Inpatient Admission/Request for Inpatient Authorization for our facility [address] [facility]  Be advised that this patient was admitted to our facility under Inpatient Status  Please contact the Utilization Review Department where the patient is receiving care services for additional admission information  Place of Service Code: 24   Place of Service Name: Inpatient Hospital  Presentation Date & Time: 2019 12:23 PM  Inpatient Admission Date & Time: 19 1421  Discharge Date & Time: No discharge date for patient encounter  Discharge Disposition (if discharged): [facility] ([place] [address], [place] in [place])  Attending Physician: Calos Paez M D  Specialty- Hand Surgery,    National Practioner ID- [id number]  Primary Office:  [address] [place] [address] [facility], [facility]  Phone 1: [phone]  Phone 2:   Fax: [phone]  Admission Orders (From admission, onward)    Ordered        19 1423  Inpatient Admission  Once             Facility: [facility]  Utilization Review Department  Phone: [phone]; Fax [phone]  [email or web address]  ATTENTION: Please call with any questions or concerns to [phone]  and carefully listen to the prompts so that you are directed to the right person  Send all requests for admission clinical reviews, approved or denied determinations and any other requests to fax [phone]   All voicemails are confidential

## 2019-07-22 NOTE — OCCUPATIONAL THERAPY NOTE
OccupationalTherapy Evaluation     Patient Name: Maty Martinez  JPYRK'X Date: 7/22/2019  Problem List  Patient Active Problem List   Diagnosis    Cellulitis of finger of right hand     Past Medical History  History reviewed  No pertinent past medical history  Past Surgical History  Past Surgical History:   Procedure Laterality Date    HAND DEBRIDEMENT Right 7/21/2019    Procedure: DEBRIDEMENT HAND/FINGER Ivan CADENA); Surgeon: Jess Lyles MD;  Location: [facility];  Service: Orthopedics     Subjective: Pt reports 0/10 pain in R hand/digits at this time  Pt reports no concerns for going home  Prior Level of Functioning:    Pt reports independent with ADLs, IADLs, and functional mobility PTA  Pt lives at home with his wife and 3 children  Pt reports his wife can assist him at home as needed upon d/c    Objective: Pt supine in bed at start of session and agreeable to OT  B/l UE AROM of shoulders and elbows is WFL  AROM of digits and wrist of L UE is WFL  AROM of digits of R UE slightly impaired and RUE wrist is slightly impaired at this time 2nd to debridement procedure and ace wrap brace  Pt able to oppose thumb to 5th digit however reports discomfort when doing so  Educated pt on AROM and PROM exercises to complete with R hand/digits  Educated pt on difference b/w AROM vs PROM  Educated to complete exercises 5 x each day to prevent contracture and promote functional use of R UE  Recommended pt complete AROM to best of ability, and then attempt PROM to see if he could range joint further  Educated that discomfort is okay, however if pain noted he should stop completion of exercise at that time  Handout provided to pt on AROM and PROM exercises  Assessment: Pt is a 37 y o  male seen for OT evaluation s/p [facility] w/ Cellulitis of finger of right hand  No known comorbidities  Pt is R-handed  Upon evaluation, pt presents with decreased ROM and impaired [address]   Education of AROM and PROM exercises provided  Pt presents with no further deficits  Cognition appears WFL  No safety awareness concerns observed  No further OT intervention is warranted at this time  D/C OT  Discharge recommendations:    From OT standpoint, pt may be d/c'd home with family support  Completion of 5x daily AROM/PROM exercises      Rocio Eric, OTR/L

## 2019-07-23 VITALS
TEMPERATURE: 98.2 F | BODY MASS INDEX: 30.12 KG/M2 | RESPIRATION RATE: 18 BRPM | HEIGHT: 70 IN | HEART RATE: 62 BPM | OXYGEN SATURATION: 98 % | WEIGHT: 210.36 LBS | SYSTOLIC BLOOD PRESSURE: 117 MMHG | DIASTOLIC BLOOD PRESSURE: 75 MMHG

## 2019-07-23 LAB
ANION GAP SERPL CALCULATED.3IONS-SCNC: 8 MMOL/L (ref 4–13)
BACTERIA WND AEROBE CULT: ABNORMAL
BUN SERPL-MCNC: 14 MG/DL (ref 5–25)
CALCIUM SERPL-MCNC: 8.1 MG/DL (ref 8.3–10.1)
CHLORIDE SERPL-SCNC: 105 MMOL/L (ref 100–108)
CO2 SERPL-SCNC: 27 MMOL/L (ref 21–32)
CREAT SERPL-MCNC: 0.81 MG/DL (ref 0.6–1.3)
GFR SERPL CREATININE-BSD FRML MDRD: 114 ML/MIN/1.73SQ M
GLUCOSE SERPL-MCNC: 95 MG/DL (ref 65–140)
GRAM STN SPEC: ABNORMAL
GRAM STN SPEC: ABNORMAL
POTASSIUM SERPL-SCNC: 3.9 MMOL/L (ref 3.5–5.3)
SODIUM SERPL-SCNC: 140 MMOL/L (ref 136–145)

## 2019-07-23 PROCEDURE — 99024 POSTOP FOLLOW-UP VISIT: CPT | Performed by: PHYSICIAN ASSISTANT

## 2019-07-23 PROCEDURE — NC001 PR NO CHARGE: Performed by: PHYSICIAN ASSISTANT

## 2019-07-23 PROCEDURE — 80048 BASIC METABOLIC PNL TOTAL CA: CPT | Performed by: PHYSICIAN ASSISTANT

## 2019-07-23 RX ORDER — SULFAMETHOXAZOLE AND TRIMETHOPRIM 800; 160 MG/1; MG/1
1 TABLET ORAL EVERY 12 HOURS SCHEDULED
Qty: 20 TABLET | Refills: 0 | Status: SHIPPED | OUTPATIENT
Start: 2019-07-23 | End: 2019-08-02

## 2019-07-23 RX ORDER — CEPHALEXIN 500 MG/1
500 CAPSULE ORAL EVERY 6 HOURS SCHEDULED
Qty: 28 CAPSULE | Refills: 0 | Status: SHIPPED | OUTPATIENT
Start: 2019-07-23 | End: 2019-07-23 | Stop reason: HOSPADM

## 2019-07-23 RX ORDER — KETOROLAC TROMETHAMINE 10 MG/1
10 TABLET, FILM COATED ORAL EVERY 8 HOURS PRN
Qty: 15 TABLET | Refills: 0 | Status: SHIPPED | OUTPATIENT
Start: 2019-07-23 | End: 2019-07-28

## 2019-07-23 RX ORDER — HYDROCODONE BITARTRATE AND ACETAMINOPHEN 5; 325 MG/1; MG/1
1 TABLET ORAL EVERY 8 HOURS PRN
Qty: 6 TABLET | Refills: 0 | Status: SHIPPED | OUTPATIENT
Start: 2019-07-23 | End: 2019-08-02

## 2019-07-23 RX ADMIN — STANDARDIZED SENNA CONCENTRATE 8.6 MG: 8.6 TABLET ORAL at 09:07

## 2019-07-23 RX ADMIN — AMPICILLIN SODIUM AND SULBACTAM SODIUM 3 G: 2; 1 INJECTION, POWDER, FOR SOLUTION INTRAMUSCULAR; INTRAVENOUS at 09:07

## 2019-07-23 RX ADMIN — AMPICILLIN SODIUM AND SULBACTAM SODIUM 3 G: 2; 1 INJECTION, POWDER, FOR SOLUTION INTRAMUSCULAR; INTRAVENOUS at 04:49

## 2019-07-23 RX ADMIN — ACETAMINOPHEN 650 MG: 325 TABLET, FILM COATED ORAL at 04:50

## 2019-07-23 RX ADMIN — ACETAMINOPHEN 650 MG: 325 TABLET, FILM COATED ORAL at 10:51

## 2019-07-23 RX ADMIN — DOCUSATE SODIUM 100 MG: 100 CAPSULE, LIQUID FILLED ORAL at 09:07

## 2019-07-23 NOTE — PLAN OF CARE
Problem: PAIN - ADULT  Goal: Verbalizes/displays adequate comfort level or baseline comfort level  Description  Interventions:  - Encourage patient to monitor pain and request assistance  - Assess pain using appropriate pain scale  - Administer analgesics based on type and severity of pain and evaluate response  - Implement non-pharmacological measures as appropriate and evaluate response  - Consider cultural and social influences on pain and pain management  - Notify physician/advanced practitioner if interventions unsuccessful or patient reports new pain  Outcome: Progressing     Problem: INFECTION - ADULT  Goal: Absence or prevention of progression during hospitalization  Description  INTERVENTIONS:  - Assess and monitor for signs and symptoms of infection  - Monitor lab/diagnostic results  - Monitor all insertion sites, i e  indwelling lines, tubes, and drains  - Monitor endotracheal (as able) and nasal secretions for changes in amount and color  - [place] appropriate cooling/warming therapies per order  - Administer medications as ordered  - Instruct and encourage patient and family to use good hand hygiene technique  - Identify and instruct in appropriate isolation precautions for identified infection/condition  Outcome: Progressing  Goal: Absence of fever/infection during neutropenic period  Description  INTERVENTIONS:  - Monitor WBC  - Implement neutropenic guidelines  Outcome: Progressing     Problem: SAFETY ADULT  Goal: Patient will remain free of falls  Description  INTERVENTIONS:  - Assess patient frequently for physical needs  -  Identify cognitive and physical deficits and behaviors that affect risk of falls    -  [place] fall precautions as indicated by assessment   - Educate patient/family on patient safety including physical limitations  - Instruct patient to call for assistance with activity based on assessment  - Modify environment to reduce risk of injury  - Consider OT/PT consult to assist with strengthening/mobility  Outcome: Progressing  Goal: Maintain or return to baseline ADL function  Description  INTERVENTIONS:  -  Assess patient's ability to carry out ADLs; assess patient's baseline for ADL function and identify physical deficits which impact ability to perform ADLs (bathing, care of mouth/teeth, toileting, grooming, dressing, etc )  - Assess/evaluate cause of self-care deficits   - Assess range of motion  - Assess patient's mobility; develop plan if impaired  - Assess patient's need for assistive devices and provide as appropriate  - Encourage maximum independence but intervene and supervise when necessary  ¯ Involve family in performance of ADLs  ¯ Assess for home care needs following discharge   ¯ Request OT consult to assist with ADL evaluation and planning for discharge  ¯ Provide patient education as appropriate  Outcome: Progressing  Goal: Maintain or return mobility status to optimal level  Description  INTERVENTIONS:  - Assess patient's baseline mobility status (ambulation, transfers, stairs, etc )    - Identify cognitive and physical deficits and behaviors that affect mobility  - Identify mobility aids required to assist with transfers and/or ambulation (gait belt, sit-to-stand, lift, walker, cane, etc )  - [place] fall precautions as indicated by assessment  - Record patient progress and toleration of activity level on Mobility SBAR; progress patient to next Phase/Stage  - Instruct patient to call for assistance with activity based on assessment  - Request Rehabilitation consult to assist with strengthening/weightbearing, etc   Outcome: Progressing

## 2019-07-23 NOTE — DISCHARGE SUMMARY
Discharge Summary - Orthopedics   Norma Rice 37 y o  male MRN: [id number]  Unit/Bed#:     Attending Physician: Dr RODRIGUES [facility]    Admitting diagnosis: Cellulitis of finger of right hand [X72 131]  Tenosynovitis of right hand [M65 9]    Discharge diagnosis: Cellulitis of finger of right hand [O56 203]  Tenosynovitis of right hand [M65 9]    Date of admission: 7/21/2019    Date of discharge: 07/23/19    Procedure:  Right ring finger I and D 7/21/19    Hospital course:  41-year-old male who sustained a puncture injury to his right ring finger which resulted in abscess formation and concern for possible tenosynovitis of the flexor tendon sheath  This was recalcitrant to conservative management  He was admitted to the orthopedic service on 7/21/19  He underwent a right ring finger I and D on 7/21/19  He was transferred to the floor in stable condition bilateral leg received adequate pain control, IV antibiotics, and PT/OT  Intraoperative cultures were obtained and grew MRSA  He noted overall improvement after surgery  She remained hemodynamically stable throughout his hospital course  The remainder of her hospital stay was unremarkable  He was discharged home in stable condition on 7/23/19  Discharge Instructions:   · Complete oral Bactrim as prescribed  · Keep incision clean and dry  Daily dressing changes  · Follow-up with Dr RODRIGUES [facility] in one week  Discharge Medications: For the complete list of discharge medications, please refer to the patient's medication reconciliation

## 2019-07-23 NOTE — PROGRESS NOTES
Progress Note - Orthopedics   Regino Prery 37 y o  male MRN: [id number]  Unit/Bed#: -01      Subjective:    34 y o male POD 2 from of right 4th finger I and D  Patient notes significant improvement in his pain  He also denies numbness, tingling, fever, chills  Labs:  0   Lab Value Date/Time    HCT 42 5 07/22/2019 0602    HCT 44 0 07/21/2019 1025    HGB 14 0 07/22/2019 0602    HGB 14 5 07/21/2019 1025    WBC 9 48 07/22/2019 0602    WBC 9 35 07/21/2019 1025       Meds:    Current Facility-Administered Medications:     acetaminophen (TYLENOL) tablet 650 mg, 650 mg, Oral, Q6H, Randi Silver, PA-C, 650 mg at 07/23/19 1051    ampicillin-sulbactam (UNASYN) 3 g in sodium chloride 0 9 % 100 mL IVPB, 3 g, Intravenous, Q6H, Randi Silver PA-C, Last Rate: 200 mL/hr at 07/23/19 0907, 3 g at 07/23/19 0907    calcium carbonate (TUMS) chewable tablet 1,000 mg, 1,000 mg, Oral, Daily PRN, Randi Silver, PA-C    docusate sodium (COLACE) capsule 100 mg, 100 mg, Oral, BID, Suzzane Riding, PA-C, 100 mg at 07/23/19 1629    ketorolac (TORADOL) injection 15 mg, 15 mg, Intravenous, Q6H PRN, Annabellezane Riding, PA-C, 15 mg at 07/22/19 2226    ondansetron (ZOFRAN) injection 4 mg, 4 mg, Intravenous, Q6H PRN, Annabellezane Riding, PA-C    oxyCODONE (ROXICODONE) IR tablet 5 mg, 5 mg, Oral, Q4H PRN, Suzzane Riding, PA-C    senna (SENOKOT) tablet 8 6 mg, 1 tablet, Oral, Daily, Suzzane Riding, PA-C, 8 6 mg at 07/23/19 0907    traMADol (ULTRAM) tablet 50 mg, 50 mg, Oral, Q6H PRN, Randi Silver PA-C, 50 mg at 07/22/19 1722    Blood Culture:   Lab Results   Component Value Date    BLOODCX No Growth at 24 hrs  07/21/2019       Wound Culture:   Lab Results   Component Value Date    WOUNDCULT (A) 07/21/2019     3+ Growth of Methicillin Resistant Staphylococcus aureus       Ins and Outs:  No intake/output data recorded            Physical:  Vitals:    07/23/19 0648   BP: 117/75   Pulse: 62   Resp: 18   Temp: 98 2 °F (36 8 °C)   SpO2: 98%     Musculoskeletal: right Upper Extremity  · Skin warm and well perfused, no erythema or significant swelling  · Dressing C/D/I  · No tenderness along the flexor tendon or into the distal tip of the finger   · SILT m/r/u   Motor intact ain/pin/m/r/u, 2+ radial pulse    Assessment:    34 y o male POD 2 from right 4th finger I&D     Plan:  · WBAT RUE  · Cultures growing out MRSA - will discharge patient on Bactrim DS for 10 days  · Continue moving fingers at home to avoid stiffness  · Pain control with norco and Toradol   · Dispo: Ok for discharge from ortho perspective, follow up with Dr Michaelle Mercedes in 5-7 days    Je Tee PA-C

## 2019-07-24 LAB
BACTERIA SPEC ANAEROBE CULT: NORMAL
BACTERIA WND AEROBE CULT: ABNORMAL
GRAM STN SPEC: ABNORMAL
GRAM STN SPEC: ABNORMAL

## 2019-07-24 NOTE — UTILIZATION REVIEW
Please send an updated auth determination to our UR Dept Fax [phone]    Notification of Discharge  This is a Notification of Discharge from our facility [address]  Please be advised that this patient has been discharge from our facility  Below you will find the admission and discharge date and time including the patients disposition  PRESENTATION DATE: 7/21/2019 12:23 PM  OBS ADMISSION DATE: N/A  IP ADMISSION DATE: 7/21/19 1421   DISCHARGE DATE: 7/23/2019  1:23 PM  DISPOSITION: Home/Self Care Home/Self Care   Admission Orders listed below:  Admission Orders (From admission, onward)    Ordered        07/21/19 1423  Inpatient Admission  Once             Please contact the UR Department if additional information is required to close this patient's authorization/case  [address]  Utilization Review Department  Phone: [phone]; Fax [phone]  [email or web address]  ATTENTION: Please call with any questions or concerns to [phone]  and carefully listen to the prompts so that you are directed to the right person  Send all requests for admission clinical reviews, approved or denied determinations and any other requests to fax [phone]   All voicemails are confidential

## 2019-07-26 LAB
BACTERIA BLD CULT: NORMAL
BACTERIA BLD CULT: NORMAL

## 2019-07-31 ENCOUNTER — OFFICE VISIT (OUTPATIENT)
Dept: OBGYN CLINIC | Facility: CLINIC | Age: 34
End: 2019-07-31

## 2019-07-31 VITALS
DIASTOLIC BLOOD PRESSURE: 79 MMHG | HEIGHT: 70 IN | HEART RATE: 70 BPM | BODY MASS INDEX: 29.35 KG/M2 | SYSTOLIC BLOOD PRESSURE: 122 MMHG | WEIGHT: 205 LBS

## 2019-07-31 DIAGNOSIS — Z98.890 STATUS POST INCISION AND DRAINAGE: Primary | ICD-10-CM

## 2019-07-31 PROCEDURE — 99024 POSTOP FOLLOW-UP VISIT: CPT | Performed by: SURGERY

## 2019-07-31 NOTE — LETTER
July 31, 2019     Patient: Jordy Moore   YOB: 1985   Date of Visit: 7/31/2019       To Whom it May Concern:    Jordy Moore is under my professional care  He was seen in my office on 7/31/2019  He may return to work on 8/16/19 to light duty activities for 2 weeks  Estimated return to full duty work 8/30/19 if full motion is obtained  Will re-evaluate in 2 weeks  If you have any questions or concerns, please don't hesitate to call           Sincerely,          Mariam Goldberg, MD        CC: No Recipients

## 2019-07-31 NOTE — PROGRESS NOTES
Assessment/Plan:  Patient ID: Gustavo Marti 37 y o  male   Surgery: Debridement Hand/finger (wash Out) - Right  Date of Surgery: 7/21/2019    Today was removed in office today, which is tolerated well  I did provide patient with finger socks to help keep his wound clean and dry when he is traveling  Patient was advised to wear these finger socks until wound is dry  The patient may attend his previously scheduled vacation however I did advise him to avoid soaking in hot tubs, pools, the ocean, and baths  Patient was instructed to begin range-of-motion exercises that were provided to him by occupational therapy  He should continue with antibiotics he has left  I did provide him with a work note stating that he may return to work as of 08/16/2019 to light duty activities for approximately 2 weeks  His estimated return to full duty is 08/30/2019  May return to full duty work as long as he has full range of motion  Will re-evaluate him in the office in 2 weeks time  Follow Up:  2  week(s)    To Do Next Visit:    range-of-motion check, re-evaluate for work      CHIEF COMPLAINT:  Chief Complaint   Patient presents with    Right Hand - Post-op         SUBJECTIVE:  Gustavo Marti is a 37y o  year old male who presents for follow up after Debridement Hand/finger (wash Out) - Right  Today patient has minimal pain  He denies any numbness and tingling today  He denies any signs of infection  He has been taking his antibiotic  He reports he is currently out of work  He does have an upcoming family vacation he would like to discuss today in regard to his limitations        PHYSICAL EXAMINATION:  General: well developed and well nourished, alert, oriented times 3 and appears comfortable  Psychiatric: Normal    MUSCULOSKELETAL EXAMINATION:  Incision: Clean, dry, intact and suture(s) intact   Surgery Site: normal, no evidence of infection   Range of Motion: As expected and Limited due to stiffness  Neurovascular status: Neuro intact, good cap refill  Activity Restrictions: Continue to keep wound clean, dry, intact, as well as covered  Done today: Sutures out, dressing changed and wound care    Area of previous abscess with some maceration and clear drainage, incision C/D/I  STUDIES REVIEWED:  No Studies to review      PROCEDURES PERFORMED:  Procedures  No Procedures performed today    Scribe Attestation    I,:   Juancarlos Barraza MA am acting as a scribe while in the presence of the attending physician :        I,:   Dinora Rojas MD personally performed the services described in this documentation    as scribed in my presence :

## 2019-08-15 ENCOUNTER — OFFICE VISIT (OUTPATIENT)
Dept: OBGYN CLINIC | Facility: CLINIC | Age: 34
End: 2019-08-15

## 2019-08-15 VITALS
WEIGHT: 220 LBS | DIASTOLIC BLOOD PRESSURE: 76 MMHG | HEIGHT: 70 IN | SYSTOLIC BLOOD PRESSURE: 117 MMHG | HEART RATE: 60 BPM | BODY MASS INDEX: 31.5 KG/M2

## 2019-08-15 DIAGNOSIS — L03.011 CELLULITIS OF FINGER OF RIGHT HAND: Primary | ICD-10-CM

## 2019-08-15 PROCEDURE — 99024 POSTOP FOLLOW-UP VISIT: CPT | Performed by: SURGERY

## 2019-08-15 NOTE — PROGRESS NOTES
Assessment/Plan:  Patient ID: Ayanna Quintanilla 37 y o  male   Surgery: Debridement Hand/finger (wash Out) - Right  Date of Surgery: 7/21/2019    Surgical sites healing well, patient is regaining majority of his motion back  Scar massage  No signs of infection, overall doing well  Patient may return to work light duty for the next week and transition to full duty as of 08/23/2019  Will see him back in 4 weeks for final wound check    Follow Up:  4 weeks    To Do Next Visit:   wound check      CHIEF COMPLAINT:  Chief Complaint   Patient presents with    Right Hand - Follow-up         SUBJECTIVE:  Ayanna Quintanilla is a 37y o  year old male who presents for follow up after Debridement Hand/finger (wash Out) - Right  Today patient has no significant pain  He denies numbness or tingling, fever/chills  He has been working on ROM at home and doing well  He is ready to begin work again  PHYSICAL EXAMINATION:  General: well developed and well nourished, alert, oriented times 3 and appears comfortable  Psychiatric: Normal    MUSCULOSKELETAL EXAMINATION:  Incision: healed  Surgery Site: normal, no evidence of infection   Range of Motion: As expected  Neurovascular status: Neuro intact, good cap refill  Activity Restrictions: May return to work light duty for the 1st week and transition to full duty as of 08/23/2019           STUDIES REVIEWED:  No Studies to review      PROCEDURES PERFORMED:  Procedures  No Procedures performed today

## 2019-09-12 ENCOUNTER — OFFICE VISIT (OUTPATIENT)
Dept: OBGYN CLINIC | Facility: CLINIC | Age: 34
End: 2019-09-12

## 2019-09-12 VITALS
WEIGHT: 197 LBS | BODY MASS INDEX: 28.2 KG/M2 | SYSTOLIC BLOOD PRESSURE: 124 MMHG | HEIGHT: 70 IN | DIASTOLIC BLOOD PRESSURE: 79 MMHG | HEART RATE: 59 BPM

## 2019-09-12 DIAGNOSIS — L03.011 CELLULITIS OF FINGER OF RIGHT HAND: Primary | ICD-10-CM

## 2019-09-12 PROCEDURE — 99024 POSTOP FOLLOW-UP VISIT: CPT | Performed by: SURGERY

## 2019-09-12 NOTE — PROGRESS NOTES
Assessment/Plan:  Patient ID: Jordy Moore 37 y o  male   Surgery: Debridement Hand/finger (wash Out) - Right  Date of Surgery: 7/21/2019    Activities as tolerated, patient has full range of motion and no complaints of pain  Discussed that the operative finger may never be quite the same as the others and may occasionally feel stiff, but this is normal and should continue to improve some  May follow up p r n  Follow Up:  PRN    To Do Next Visit:   n/a      CHIEF COMPLAINT:  Chief Complaint   Patient presents with    Right Hand - Follow-up         SUBJECTIVE:  Jordy Moore is a 37y o  year old male who presents for follow up after Debridement Hand/finger (wash Out) - Right  Today patient has No Complaints  He denies pain, numbness, tingling  He does take he occasionally notes some stiffness in the finger but has full use full range of motion of his hand  Patient's return to work without any issues          PHYSICAL EXAMINATION:  General: well developed and well nourished, alert, oriented times 3 and appears comfortable  Psychiatric: Normal    MUSCULOSKELETAL EXAMINATION:  Incision: healed  Surgery Site: normal, no evidence of infection   Range of Motion: opposition intact and full composite fist possible  Neurovascular status: Neuro intact, good cap refill  Activity Restrictions: No restrictions         STUDIES REVIEWED:  No Studies to review      PROCEDURES PERFORMED:  Procedures  No Procedures performed today      John Baxter PA-C

## 2023-05-10 ENCOUNTER — OFFICE VISIT (OUTPATIENT)
Dept: URGENT CARE | Facility: CLINIC | Age: 38
End: 2023-05-10

## 2023-05-10 VITALS
OXYGEN SATURATION: 99 % | TEMPERATURE: 97.4 F | HEART RATE: 79 BPM | RESPIRATION RATE: 16 BRPM | WEIGHT: 240 LBS | BODY MASS INDEX: 34.36 KG/M2 | HEIGHT: 70 IN

## 2023-05-10 DIAGNOSIS — Z20.818 EXPOSURE TO STREP THROAT: ICD-10-CM

## 2023-05-10 DIAGNOSIS — M54.42 ACUTE LEFT-SIDED LOW BACK PAIN WITH LEFT-SIDED SCIATICA: Primary | ICD-10-CM

## 2023-05-10 LAB — S PYO AG THROAT QL: NEGATIVE

## 2023-05-10 RX ORDER — CYCLOBENZAPRINE HCL 5 MG
5 TABLET ORAL 3 TIMES DAILY PRN
Qty: 10 TABLET | Refills: 0 | Status: SHIPPED | OUTPATIENT
Start: 2023-05-10

## 2023-05-10 RX ORDER — METHYLPREDNISOLONE SODIUM SUCCINATE 40 MG/ML
40 INJECTION, POWDER, LYOPHILIZED, FOR SOLUTION INTRAMUSCULAR; INTRAVENOUS ONCE
Status: COMPLETED | OUTPATIENT
Start: 2023-05-10 | End: 2023-05-10

## 2023-05-10 RX ADMIN — METHYLPREDNISOLONE SODIUM SUCCINATE 40 MG: 40 INJECTION, POWDER, LYOPHILIZED, FOR SOLUTION INTRAMUSCULAR; INTRAVENOUS at 10:35

## 2023-05-10 NOTE — PROGRESS NOTES
[facility]        NAME: Vibha Rico is a 34 y o  male  : 1985    MRN: [id number]  DATE: May 10, 2023  TIME: 11:04 AM    Assessment and Plan   Acute left-sided low back pain with left-sided sciatica [M54 42]  1  Acute left-sided low back pain with left-sided sciatica  methylPREDNISolone sodium succinate (Solu-MEDROL) injection 40 mg    cyclobenzaprine (FLEXERIL) 5 mg tablet      2  Exposure to strep throat  POCT rapid strepA        Advised to avoid aleve for the rest of the day, may take tylenol if needed  Will send rx for flexeril but discussed possible s/es including drowsiness and advised never to take before working or driving (or when on baby duty) until he knows how it affects him  If no improvement or any worsening then should follow up with pcp or if severe then go to ED  Discussed strict return to care precautions as well as red flag symptoms which should prompt immediate ED referral  Pt verbalized understanding and is in agreement with plan  Please follow up with your primary care provider within the next week  Please remember that your visit today was with an urgent care provider and should not replace follow up with your primary care provider for chronic medical issues or annual physicals  Patient Instructions       Follow up with PCP in 3-5 days  Proceed to  ER if symptoms worsen  Chief Complaint     Chief Complaint   Patient presents with   • Back Pain     Back pain since Saturday, getting worse- near tailbone, radiating down to left leg  OTC Aleve, did not help  No known cause of pain, no injury    • Sore Throat     Sore throat- started yesterday  Wife tested pos for strep  OTC Dayquil          History of Present Illness       Pt is a 33 yo male with no pertinent pmh pw back pain x 5 days and sore throat x 2 days      - Back pain started out dull 4/10 lower back ache  No known trauma or fall  Has been taking care of a  so has been bending over a lot   On day 2 "pain increased to 10/10, \"debilitating\" pain that he couldn't do anything about  Radiates down posterior thigh into posterior ankle  Worse with lying down and sitting, better with laying in fetal position and walking  Took Aleve with minimal relief  Feels it is improving over last few days  Did not take any medication today and pain is currently 5/10 in severity  No changes in bowel or bladder function; no saddle anesthesia  - Woke up with sore throat yesterday, associated with congestion  No fevers, headaches, cough, GI symptoms  Takes claritin daily for seasonal allergies  Also took dayquil yesterday without relief  Wife has strep  Review of Systems   Review of Systems   Constitutional: Negative for chills, diaphoresis, fatigue and fever  HENT: Positive for congestion and sore throat  Negative for ear pain, postnasal drip, rhinorrhea, sinus pain, sneezing and trouble swallowing  Eyes: Negative for pain and redness  Respiratory: Negative for cough, chest tightness, shortness of breath and wheezing  Cardiovascular: Negative for chest pain and leg swelling  Gastrointestinal: Negative for diarrhea, nausea and vomiting  Musculoskeletal: Positive for back pain  Negative for arthralgias, gait problem, joint swelling, myalgias and neck pain  Skin: Negative for color change and wound  Neurological: Negative for dizziness, weakness, numbness and headaches  Current Medications       Current Outpatient Medications:   •  cyclobenzaprine (FLEXERIL) 5 mg tablet, Take 1 tablet (5 mg total) by mouth 3 (three) times a day as needed for muscle spasms, Disp: 10 tablet, Rfl: 0  •  ketorolac (TORADOL) 10 mg tablet, Take 1 tablet (10 mg total) by mouth every 8 (eight) hours as needed for moderate pain for up to 5 days, Disp: 15 tablet, Rfl: 0  No current facility-administered medications for this visit      Current Allergies     Allergies as of 05/10/2023   • (No Known Allergies)            The " "following portions of the patient's history were reviewed and updated as appropriate: allergies, current medications, past family history, past medical history, past social history, past surgical history and problem list      History reviewed  No pertinent past medical history  Past Surgical History:   Procedure Laterality Date   • HAND DEBRIDEMENT Right 7/21/2019    Procedure: DEBRIDEMENT HAND/FINGER [facility] OUT); Surgeon: Olga Lidia Dhaliwal MD;  [facility];  Service: Orthopedics       History reviewed  No pertinent family history  Medications have been verified  Objective   Pulse 79   Temp (!) 97 4 °F (36 3 °C)   Resp 16   Ht 5' 10\" (1 778 m)   Wt 109 kg (240 lb)   SpO2 99%   BMI 34 44 kg/m²        Physical Exam     Physical Exam  Vitals and nursing note reviewed  Constitutional:       General: He is not in acute distress  Appearance: Normal appearance  He is not ill-appearing, toxic-appearing or diaphoretic  HENT:      Head: Normocephalic and atraumatic  Right Ear: Tympanic membrane, ear canal and external ear normal       Left Ear: Tympanic membrane, ear canal and external ear normal       Nose: No congestion  Mouth/Throat:      Mouth: Mucous membranes are moist       Pharynx: Oropharynx is clear  No oropharyngeal exudate or posterior oropharyngeal erythema  Tonsils: No tonsillar exudate  1+ on the right  1+ on the left  Eyes:      Conjunctiva/sclera: Conjunctivae normal       Pupils: Pupils are equal, round, and reactive to light  Cardiovascular:      Rate and Rhythm: Normal rate and regular rhythm  Heart sounds: Normal heart sounds  Pulmonary:      Effort: Pulmonary effort is normal  No respiratory distress  Breath sounds: Normal breath sounds  No wheezing, rhonchi or rales  Abdominal:      General: Abdomen is flat  Palpations: Abdomen is soft  Tenderness: There is no right CVA tenderness or left CVA tenderness   " Musculoskeletal:      Cervical back: Normal, normal range of motion and neck supple  Thoracic back: Normal       Lumbar back: Tenderness (midline and paraspinal) present  No swelling, edema, spasms or bony tenderness  Decreased range of motion  Positive left straight leg raise test  Negative right straight leg raise test    Lymphadenopathy:      Cervical: No cervical adenopathy  Skin:     General: Skin is warm and dry  Capillary Refill: Capillary refill takes less than 2 seconds  Neurological:      General: No focal deficit present  Mental Status: He is alert and oriented to person, place, and time  Sensory: No sensory deficit  Motor: No weakness        Coordination: Coordination normal       Deep Tendon Reflexes: Reflexes normal    Psychiatric:         Behavior: Behavior normal

## 2023-06-05 ENCOUNTER — OFFICE VISIT (OUTPATIENT)
Dept: FAMILY MEDICINE CLINIC | Facility: CLINIC | Age: 38
End: 2023-06-05
Payer: COMMERCIAL

## 2023-06-05 VITALS
HEIGHT: 70 IN | SYSTOLIC BLOOD PRESSURE: 124 MMHG | HEART RATE: 68 BPM | DIASTOLIC BLOOD PRESSURE: 82 MMHG | BODY MASS INDEX: 36.05 KG/M2 | RESPIRATION RATE: 18 BRPM | TEMPERATURE: 98.9 F | OXYGEN SATURATION: 97 % | WEIGHT: 251.8 LBS

## 2023-06-05 DIAGNOSIS — Z00.00 ANNUAL PHYSICAL EXAM: Primary | ICD-10-CM

## 2023-06-05 PROCEDURE — 99386 PREV VISIT NEW AGE 40-64: CPT | Performed by: STUDENT IN AN ORGANIZED HEALTH CARE EDUCATION/TRAINING PROGRAM

## 2023-06-05 NOTE — PROGRESS NOTES
[facility]    NAME: Tavares French  AGE: 34 y o  SEX: male  : 1985     DATE: 2023     Assessment and Plan:     Problem List Items Addressed This Visit    None  Visit Diagnoses     Annual physical exam    -  Primary        Physical exam completed in office today, patient did have recent blood work for [facility], uploaded to chart, reviewed with patient cholesterol, A1c, other lab markers within normal limits  Recommend yearly follow-up  Lifestyle modifications discussed with patient including low added sugar diet, routine exercise  Immunizations and preventive care screenings were discussed with patient today  Appropriate education was printed on patient's after visit summary  Counseling:  Alcohol/drug use: discussed moderation in alcohol intake, the recommendations for healthy alcohol use, and avoidance of illicit drug use  Dental Health: discussed importance of regular tooth brushing, flossing, and dental visits  Injury prevention: discussed safety/seat belts, safety helmets, smoke detectors, carbon dioxide detectors, and smoking near bedding or upholstery  Sexual health: discussed sexually transmitted diseases, partner selection, use of condoms, avoidance of unintended pregnancy, and contraceptive alternatives  Exercise: the importance of regular exercise/physical activity was discussed  Recommend exercise 3-5 times per week for at least 30 minutes  BMI Counseling: Body mass index is 36 13 kg/m²  The BMI is above normal  Nutrition recommendations include decreasing portion sizes, encouraging healthy choices of fruits and vegetables and moderation in carbohydrate intake  Exercise recommendations include moderate physical activity 150 minutes/week  Patient referred to PCP  Rationale for BMI follow-up plan is due to patient being overweight or obese       Depression Screening and Follow-up Plan: Patient was screened for depression during today's encounter  They screened negative with a PHQ-2 score of 0  Return if symptoms worsen or fail to improve  Chief Complaint:     Chief Complaint   Patient presents with   • Establish Care      History of Present Illness:     Adult Annual Physical   Patient here for a comprehensive physical exam  The patient reports no problems  Diet and Physical Activity  Diet/Nutrition: well balanced diet  Exercise: no formal exercise  Depression Screening  PHQ-2/9 Depression Screening    Little interest or pleasure in doing things: 0 - not at all  Feeling down, depressed, or hopeless: 0 - not at all  PHQ-2 Score: 0  PHQ-2 Interpretation: Negative depression screen       General Health  Sleep: gets 4-6 hours of sleep on average  Hearing: normal - bilateral   Vision: no vision problems  Dental: regular dental visits   Health  Symptoms include: none     Review of Systems:     Review of Systems   Constitutional: Negative for chills, fatigue and fever  HENT: Negative for congestion and sore throat  Eyes: Negative for pain and visual disturbance  Respiratory: Negative for shortness of breath and wheezing  Cardiovascular: Negative for chest pain and palpitations  Gastrointestinal: Negative for abdominal pain, constipation, diarrhea, nausea and vomiting  Genitourinary: Negative for dysuria and frequency  Musculoskeletal: Negative for back pain and neck pain  Skin: Negative for color change and rash  Neurological: Negative for dizziness and headaches  Psychiatric/Behavioral: Negative for agitation and confusion  All other systems reviewed and are negative  Past Medical History:     History reviewed  No pertinent past medical history  Past Surgical History:     Past Surgical History:   Procedure Laterality Date   • HAND DEBRIDEMENT Right 7/21/2019    Procedure: DEBRIDEMENT HAND/FINGER [facility] OUT);   Surgeon: Shakir Colmenares MD; "Location: [facility];  Service: Orthopedics      Family History:     History reviewed  No pertinent family history  Social History:     Social History     Socioeconomic History   • Marital status: /Civil Union     Spouse name: None   • Number of children: None   • Years of education: None   • Highest education level: None   Occupational History   • None   Tobacco Use   • Smoking status: Never   • Smokeless tobacco: Never   Substance and Sexual Activity   • Alcohol use: Not Currently     Comment: rarely   • Drug use: Never   • Sexual activity: None   Other Topics Concern   • None   Social History Narrative   • None     Social Determinants of Health     Financial Resource Strain: Not on file   Food Insecurity: Not on file   Transportation Needs: Not on file   Physical Activity: Not on file   Stress: Not on file   Social Connections: Not on file   Intimate Partner Violence: Not on file   Housing Stability: Not on file      Current Medications:     No current outpatient medications on file  No current facility-administered medications for this visit  Allergies:     No Known Allergies   Physical Exam:     /82 (BP Location: Left arm, Patient Position: Sitting, Cuff Size: Large)   Pulse 68   Temp 98 9 °F (37 2 °C)   Resp 18   Ht 5' 10\" (1 778 m)   Wt 114 kg (251 lb 12 8 oz)   SpO2 97%   BMI 36 13 kg/m²     Physical Exam  Vitals and nursing note reviewed  Constitutional:       General: He is not in acute distress  Appearance: He is well-developed  HENT:      Head: Normocephalic and atraumatic  Eyes:      Conjunctiva/sclera: Conjunctivae normal    Cardiovascular:      Rate and Rhythm: Normal rate and regular rhythm  Heart sounds: No murmur heard  Pulmonary:      Effort: Pulmonary effort is normal  No respiratory distress  Breath sounds: Normal breath sounds  Abdominal:      General: Bowel sounds are normal  There is no distension  Palpations: Abdomen is soft        " Tenderness: There is no abdominal tenderness  Musculoskeletal:         General: No swelling  Normal range of motion  Cervical back: Neck supple  Skin:     General: Skin is warm and dry  Capillary Refill: Capillary refill takes less than 2 seconds  Neurological:      General: No focal deficit present  Mental Status: He is alert and oriented to person, place, and time  Mental status is at baseline     Psychiatric:         Mood and Affect: Mood normal          Behavior: Behavior normal           Gelacio Virk DO  [address]

## 2024-06-14 NOTE — UTILIZATION REVIEW
Caller: Lavinia Fatima    Relationship: Self    Best call back number:  [phone] / LVM    What orders are you requesting (i.e. lab or imaging): DEXA SCAN    In what timeframe would the patient need to come in: LAST DEXA WAS 12/27/22    Where will you receive your lab/imaging services: [facility]    Additional notes: PLEASE CALL PT TO LET HER KNOW ONCE THE DEXA ORDERS HAVE BEEN PLACED SO SHE CAN MAKE AN APPT    THANK YOU!         Initial Clinical Review    Admission: Date/Time/Statement: 7/21/19 @ 1421     Orders Placed This Encounter   Procedures    Inpatient Admission     Standing Status:   Standing     Number of Occurrences:   1     Order Specific Question:   Admitting Physician     Answer:   Ash Caba [[id number]]     Order Specific Question:   Level of Care     Answer:   Med Surg [16]     Order Specific Question:   Estimated length of stay     Answer:   More than 2 Midnights     Order Specific Question:   Certification     Answer:   I certify that inpatient services are medically necessary for this patient for a duration of greater than two midnights  See H&P and MD Progress Notes for additional information about the patient's course of treatment  ED Arrival Information     Expected Arrival Acuity Means of Arrival Escorted By Service Admission Type    7/21/2019 7/21/2019 11:37 Urgent Walk-In Family Member Orthopedic Surgery Urgent    Arrival Complaint    -        Chief Complaint   Patient presents with    Finger Injury     pain and swelling right ring finger , started with bee or spider bite, started with severe pain , sent from [facility] to be seen by hand specialist     Assessment/Plan: This  Is a 37year old male from home to ED as a transfer from [place] [id number] admitted inpatient due to Right 4th finger abscess and tenosynovitis   Transferred to [facility] for hand surgeon which is not available at [facility]  Presented due to increased swelling of right 4th finger since Tuesday  With associated pain  He was seen at outpatient clinic given IM Toradol for pain and had  small stab incision to the palmar surface of his finger between the PIP and the IP joints with reportedly copious amount of pus-like material then sent to ED  On exam has erythema and swelling of 4th finger  Transferred to [facility]     Seen by hand surgery and High suspicion for flexor tenosynovitis with a more superficial fluid collection   IV antibiotics in progress  Procedure 7/21/2019- DEBRIDEMENT HAND/FINGER [facility] OUT) (Right)  Finding: Purulent collection superficial to the flexor tendon sheath with small communication  Purulent fluid at the level of the A4 and A5 pulley within the flexor tendon sheath  Murky fluid at the level of the A1 pulley without maria c evidence of flexor tenosynovitis proximal to the MP joint  On 7/22 is post op day one right 4th finger I&D  Had significant pain overnight  Has improvement of swelling and tingling  Antibiotics in progress awaiting cultures    Pain control              ED Triage Vitals   Temperature Pulse Respirations Blood Pressure SpO2   07/21/19 1213 07/21/19 1213 07/21/19 1213 07/21/19 1213 07/21/19 1213   99 1 °F (37 3 °C) 63 16 142/83 97 %      Temp Source Heart Rate Source Patient Position - Orthostatic VS BP Location FiO2 (%)   07/21/19 1551 07/21/19 1506 07/21/19 1506 07/21/19 1213 --   Temporal Monitor Lying Left arm       Pain Score       07/21/19 1551       3        Wt Readings from Last 1 Encounters:   07/21/19 95 4 kg (210 lb 5 8 oz)     Additional Vital Signs:   07/22/19 1000  98 9 °F (37 2 °C)  73  18  132/71  97 %  None (Room air)  Lying   07/22/19 0828  --  --  --  --  --  None (Room air)  --   07/22/19 0600  99 1 °F (37 3 °C)  71  18  129/77  97 %  None (Room air)  Lying   07/22/19 0200  98 5 °F (36 9 °C)  66  18  126/80  99 %  None (Room air)  Lying   07/21/19 2200  98 2 °F (36 8 °C)  74  18  124/72  98 %  None (Room air)  Lying   07/21/19 2100  98 6 °F (37 °C)  79  20  133/74  99 %  None (Room air)  Lying   07/21/19 2000  98 6 °F (37 °C)  74  18  128/74  99 %  None (Room air)  Lying   07/21/19 1900  98 5 °F (36 9 °C)  65  20  136/71  100 %  None (Room air)  Sitting   07/21/19 1800  98 7 °F (37 1 °C)  64  18  133/85  99 %  None (Room air)  Sitting   07/21/19 1551  99 1 °F (37 3 °C)  81  18  139/82  96 %  None (Room air)         Pertinent Labs/Diagnostic Test Results: no imaging  Results from last 7 days   Lab Units 07/22/19  0602 07/21/19  1025   WBC Thousand/uL 9 48 9 35   HEMOGLOBIN g/dL 14 0 14 5   HEMATOCRIT % 42 5 44 0   PLATELETS Thousands/uL 225 251   NEUTROS ABS Thousands/µL 6 56 7 02     Results from last 7 days   Lab Units 07/22/19  0602 07/21/19  1025   SODIUM mmol/L 141 141   POTASSIUM mmol/L 3 9 3 6   CHLORIDE mmol/L 106 104   CO2 mmol/L 25 30   ANION GAP mmol/L 10 7   BUN mg/dL 14 18   CREATININE mg/dL 0 83 0 88   EGFR ml/min/1 73sq m 113 111   CALCIUM mg/dL 8 7 8 7     Results from last 7 days   Lab Units 07/21/19  1025   AST U/L 13   ALT U/L 27   ALK PHOS U/L 50   TOTAL PROTEIN g/dL 7 6   ALBUMIN g/dL 4 1   TOTAL BILIRUBIN mg/dL 0 70     Results from last 7 days   Lab Units 07/22/19  0602 07/21/19  1025   GLUCOSE RANDOM mg/dL 91 100     Wound culture  Results from last 7 days   Lab Units 07/21/19  1628 07/21/19  0858   GRAM STAIN RESULT  1+ Gram positive cocci in pairs*  Rare Polys* No polys seen*  4+ Gram positive cocci in pairs*       ED Treatment:  Blood and wound cultures   Medication Administration from 07/21/2019 1119 to 07/21/2019 1550       Date/Time Order Dose Route Action Comments     07/21/2019 1535 ceFAZolin (ANCEF) IVPB (premix) 1,000 mg 1,000 mg Intravenous New Bag      07/21/2019 1456 senna (SENOKOT) tablet 8 6 mg 8 6 mg Oral Given         History reviewed  No pertinent past medical history    Present on Admission:  **None**      Admitting Diagnosis: Cellulitis of finger of right hand [L03 011]  Tenosynovitis of right hand [M65 9]  Age/Sex: 37 y o  male  Admission Orders: 7/21/2019  1423 INPATIENT     Current Facility-Administered Medications:  Acetaminophen - used x 1 650 mg Oral Q6H    ampicillin-sulbactam 3 g Intravenous Q6H Last Rate: 3 g (07/22/19 0306)   calcium carbonate 1,000 mg Oral Daily PRN    docusate sodium 100 mg Oral BID    ondansetron 4 mg Intravenous Q6H PRN    oxyCODONE - used x 2 5 mg Oral Q4H PRN    senna 1 tablet Oral Daily traMADol - used x 1 50 mg Oral Q6H PRN        Network Utilization Review Department  Phone: [phone]; Fax [phone]  [email or web address]  ATTENTION: Please call with any questions or concerns to [phone]  and carefully listen to the prompts so that you are directed to the right person  Send all requests for admission clinical reviews, approved or denied determinations and any other requests to fax [phone]   All voicemails are confidential

## 2024-10-09 ENCOUNTER — OFFICE VISIT (OUTPATIENT)
Dept: URGENT CARE | Facility: CLINIC | Age: 39
End: 2024-10-09
Payer: COMMERCIAL

## 2024-10-09 VITALS
SYSTOLIC BLOOD PRESSURE: 132 MMHG | TEMPERATURE: 98.1 F | WEIGHT: 242 LBS | BODY MASS INDEX: 34.65 KG/M2 | HEART RATE: 83 BPM | RESPIRATION RATE: 20 BRPM | DIASTOLIC BLOOD PRESSURE: 80 MMHG | HEIGHT: 70 IN | OXYGEN SATURATION: 100 %

## 2024-10-09 DIAGNOSIS — B96.89 ACUTE BACTERIAL SINUSITIS: Primary | ICD-10-CM

## 2024-10-09 DIAGNOSIS — J01.90 ACUTE BACTERIAL SINUSITIS: Primary | ICD-10-CM

## 2024-10-09 PROCEDURE — 99213 OFFICE O/P EST LOW 20 MIN: CPT | Performed by: FAMILY MEDICINE

## 2024-10-09 RX ORDER — FLUTICASONE PROPIONATE 50 MCG
1 SPRAY, SUSPENSION (ML) NASAL DAILY
Qty: 16 G | Refills: 0 | Status: SHIPPED | OUTPATIENT
Start: 2024-10-09

## 2024-10-09 NOTE — PATIENT INSTRUCTIONS
- Augmentin x 7 days prescribed, complete as directed, side effects discussed, take antibiotic with food/water and yogurt and probiotics to help prevent GI upset.   - Flonase nasal spray prescribed, use as directed to help with nasal/sinus symptoms   - take Tylenol or Motrin as needed for pain/fever   - drink plenty of fluids and run a humidifier at home   - try warm salt water gargles and throat lozenges as needed   - drink warm tea w/ lemon and honey   - if symptoms persist despite treatment or worsen, follow up w/ PCP office for re-check

## 2024-10-09 NOTE — PROGRESS NOTES
[facility]        NAME: Simon Lane is a 39 y.o. male  : 1985    MRN: [id number]  DATE: 2024  TIME: 10:18 AM    Assessment and Plan   Acute bacterial sinusitis [J01.90, B96.89]  1. Acute bacterial sinusitis  amoxicillin-clavulanate (AUGMENTIN) 875-125 mg per tablet    fluticasone (FLONASE) 50 mcg/act nasal spray        Patient Instructions     Patient Instructions   - Augmentin x 7 days prescribed, complete as directed, side effects discussed, take antibiotic with food/water and yogurt and probiotics to help prevent GI upset.   - Flonase nasal spray prescribed, use as directed to help with nasal/sinus symptoms   - take Tylenol or Motrin as needed for pain/fever   - drink plenty of fluids and run a humidifier at home   - try warm salt water gargles and throat lozenges as needed   - drink warm tea w/ lemon and honey   - if symptoms persist despite treatment or worsen, follow up w/ PCP office for re-check     Follow up with PCP in 3-5 days.  Proceed to  ER if symptoms worsen.    If tests have been performed at [facility], our office will contact you with results if changes need to be made to the care plan discussed with you at the visit.  [facility]    Chief Complaint     Chief Complaint   Patient presents with    Cold Like Symptoms     Pt has sore throat, sneezing, itchy eyes, bodyaches, sinus pressure, and headache. Pt used Tylenol cold and flu.     History of Present Illness     38 yo male presents c/o chills, headache, body aches, itchy watery eyes, sinus pain and pressure, nasal congestion, rhinorrhea, sneezing, post-nasal drip, sore throat, and dry cough. He has been ill for about 1 week and feels his symptoms are progressively getting worse. No fever. No chest pain, SOB, or wheezing. Patient is not a smoker. No recent travel. Patient has no known allergies. He has taken Tylenol Cold and Flu for the symptoms.      Review of Systems   Review  "of Systems   Constitutional:  Positive for chills.   HENT:  Positive for congestion, postnasal drip, rhinorrhea, sinus pressure, sinus pain and sore throat.    Eyes:  Positive for itching. Negative for photophobia, pain, discharge, redness and visual disturbance.   Respiratory:  Positive for cough.    Cardiovascular: Negative.    Gastrointestinal: Negative.    Musculoskeletal:  Positive for myalgias.   Skin: Negative.    Allergic/Immunologic: Negative.    Neurological:  Positive for headaches.   Hematological: Negative.      Current Medications       Current Outpatient Medications:     amoxicillin-clavulanate (AUGMENTIN) 875-125 mg per tablet, Take 1 tablet by mouth every 12 (twelve) hours for 7 days, Disp: 14 tablet, Rfl: 0    fluticasone (FLONASE) 50 mcg/act nasal spray, 1 spray into each nostril daily, Disp: 16 g, Rfl: 0    Current Allergies     Allergies as of 10/09/2024    (No Known Allergies)            The following portions of the patient's history were reviewed and updated as appropriate: allergies, current medications, past family history, past medical history, past social history, past surgical history and problem list.     Past Medical History:   Diagnosis Date    Patient denies medical problems        Past Surgical History:   Procedure Laterality Date    HAND DEBRIDEMENT Right 7/21/2019    Procedure: DEBRIDEMENT HAND/FINGER (WASH OUT);  Surgeon: Benton Velasquez MD;  Location: [facility];  Service: Orthopedics       Family History   Problem Relation Age of Onset    No Known Problems Mother     Diabetes Father          Medications have been verified.        Objective   /80   Pulse 83   Temp 98.1 °F (36.7 °C)   Resp 20   Ht 5' 10\" (1.778 m)   Wt 110 kg (242 lb)   SpO2 100%   BMI 34.72 kg/m²   No LMP for male patient.       Physical Exam     Physical Exam  Vitals and nursing note reviewed.   Constitutional:       General: He is awake. He is not in acute distress.     Appearance: Normal " appearance. He is well-developed and well-groomed. He is not ill-appearing, toxic-appearing or diaphoretic.   HENT:      Head: Normocephalic and atraumatic.      Jaw: There is normal jaw occlusion.      Right Ear: Tympanic membrane, ear canal and external ear normal.      Left Ear: Tympanic membrane, ear canal and external ear normal.      Nose: Mucosal edema and congestion present.      Right Sinus: Maxillary sinus tenderness and frontal sinus tenderness present.      Left Sinus: Maxillary sinus tenderness and frontal sinus tenderness present.      Mouth/Throat:      Lips: Pink. No lesions.      Mouth: Mucous membranes are moist.      Pharynx: Uvula midline. Posterior oropharyngeal erythema and postnasal drip present. No pharyngeal swelling, oropharyngeal exudate or uvula swelling.      Tonsils: No tonsillar exudate or tonsillar abscesses.   Eyes:      General: Lids are normal.      Conjunctiva/sclera: Conjunctivae normal.   Neck:      Trachea: Trachea and phonation normal.   Cardiovascular:      Rate and Rhythm: Normal rate and regular rhythm.      Pulses: Normal pulses.      Heart sounds: Normal heart sounds.   Pulmonary:      Effort: Pulmonary effort is normal. No tachypnea, accessory muscle usage or respiratory distress.      Breath sounds: Normal breath sounds and air entry.   Musculoskeletal:      Cervical back: Neck supple. No edema, erythema, rigidity or tenderness.   Lymphadenopathy:      Cervical: No cervical adenopathy.   Skin:     General: Skin is warm and dry.      Capillary Refill: Capillary refill takes less than 2 seconds.      Coloration: Skin is not pale.   Neurological:      Mental Status: He is alert and oriented to person, place, and time. Mental status is at baseline.   Psychiatric:         Mood and Affect: Mood normal.         Behavior: Behavior normal. Behavior is cooperative.         Thought Content: Thought content normal.         Judgment: Judgment normal.

## (undated) DEVICE — LIGHT HANDLE COVER SLEEVE DISP BLUE STELLAR

## (undated) DEVICE — GLOVE SRG BIOGEL 6.5

## (undated) DEVICE — SYRINGE CATH TIP 50ML

## (undated) DEVICE — OCCLUSIVE GAUZE STRIP,3% BISMUTH TRIBROMOPHENATE IN PETROLATUM BLEND: Brand: XEROFORM

## (undated) DEVICE — TUBING SUCTION 5MM X 12 FT

## (undated) DEVICE — GLOVE SRG BIOGEL 7

## (undated) DEVICE — GLOVE INDICATOR PI UNDERGLOVE SZ 6.5 BLUE

## (undated) DEVICE — GLOVE INDICATOR PI UNDERGLOVE SZ 7 BLUE

## (undated) DEVICE — SPONGE PVP SCRUB WING STERILE

## (undated) DEVICE — STERILE BETHLEHEM PLASTIC HAND: Brand: CARDINAL HEALTH

## (undated) DEVICE — IMPERVIOUS STOCKINETTE: Brand: DEROYAL

## (undated) DEVICE — CHLORAPREP HI-LITE 26ML ORANGE

## (undated) DEVICE — GAUZE SPONGES,16 PLY: Brand: CURITY

## (undated) DEVICE — CUFF TOURNIQUET 18 X 4 IN QUICK CONNECT DISP 1 BLADDER

## (undated) DEVICE — INTENDED FOR TISSUE SEPARATION, AND OTHER PROCEDURES THAT REQUIRE A SHARP SURGICAL BLADE TO PUNCTURE OR CUT.: Brand: BARD-PARKER ® CARBON RIB-BACK BLADES

## (undated) DEVICE — STRETCH BANDAGE: Brand: CURITY